# Patient Record
Sex: FEMALE | Race: WHITE | NOT HISPANIC OR LATINO | Employment: PART TIME | URBAN - METROPOLITAN AREA
[De-identification: names, ages, dates, MRNs, and addresses within clinical notes are randomized per-mention and may not be internally consistent; named-entity substitution may affect disease eponyms.]

---

## 2018-02-06 LAB
HBV SURFACE AG SERPL QL IA: NEGATIVE
HIV 1+2 AB+HIV1 P24 AG SERPL QL IA: NON REACTIVE
RUBELLA ABY IGG: 2.73
RUBELLA ANTIBODY IGG QUANTITATIVE: NORMAL IU/ML
T PALLIDUM IGG SER QL: NON REACTIVE

## 2018-05-08 LAB — T PALLIDUM IGG SER QL: NON REACTIVE

## 2018-07-02 ENCOUNTER — TRANSFERRED RECORDS (OUTPATIENT)
Dept: HEALTH INFORMATION MANAGEMENT | Facility: CLINIC | Age: 33
End: 2018-07-02

## 2018-07-03 ENCOUNTER — TELEPHONE (OUTPATIENT)
Dept: INFUSION THERAPY | Facility: CLINIC | Age: 33
End: 2018-07-03

## 2018-07-03 ENCOUNTER — HOSPITAL ENCOUNTER (OUTPATIENT)
Dept: LAB | Facility: CLINIC | Age: 33
Discharge: HOME | End: 2018-07-03
Attending: OBSTETRICS & GYNECOLOGY | Admitting: OBSTETRICS & GYNECOLOGY
Payer: COMMERCIAL

## 2018-07-03 DIAGNOSIS — D50.9 IRON DEFICIENCY ANEMIA: ICD-10-CM

## 2018-07-03 DIAGNOSIS — D50.9 IRON DEFICIENCY ANEMIA: Primary | ICD-10-CM

## 2018-07-03 DIAGNOSIS — O99.011 ANEMIA COMPLICATING PREGNANCY IN FIRST TRIMESTER: ICD-10-CM

## 2018-07-03 LAB
FERRITIN SERPL-MCNC: 8 NG/ML (ref 12–150)
TRANSFERRIN SERPL-MCNC: 445 MG/DL (ref 210–360)

## 2018-07-03 PROCEDURE — 36415 COLL VENOUS BLD VENIPUNCTURE: CPT | Performed by: OBSTETRICS & GYNECOLOGY

## 2018-07-03 PROCEDURE — 82728 ASSAY OF FERRITIN: CPT | Performed by: OBSTETRICS & GYNECOLOGY

## 2018-07-03 PROCEDURE — 84466 ASSAY OF TRANSFERRIN: CPT | Performed by: OBSTETRICS & GYNECOLOGY

## 2018-07-05 DIAGNOSIS — O99.019 ANEMIA COMPLICATING PREGNANCY: ICD-10-CM

## 2018-07-07 ENCOUNTER — HEALTH MAINTENANCE LETTER (OUTPATIENT)
Age: 33
End: 2018-07-07

## 2018-07-10 ENCOUNTER — INFUSION THERAPY VISIT (OUTPATIENT)
Dept: INFUSION THERAPY | Facility: CLINIC | Age: 33
End: 2018-07-10
Attending: OBSTETRICS & GYNECOLOGY
Payer: COMMERCIAL

## 2018-07-10 VITALS
TEMPERATURE: 97.9 F | HEART RATE: 93 BPM | SYSTOLIC BLOOD PRESSURE: 109 MMHG | RESPIRATION RATE: 16 BRPM | DIASTOLIC BLOOD PRESSURE: 68 MMHG

## 2018-07-10 DIAGNOSIS — O99.019 ANEMIA COMPLICATING PREGNANCY: Primary | ICD-10-CM

## 2018-07-10 PROCEDURE — 25000128 H RX IP 250 OP 636: Performed by: OBSTETRICS & GYNECOLOGY

## 2018-07-10 PROCEDURE — 96365 THER/PROPH/DIAG IV INF INIT: CPT

## 2018-07-10 RX ORDER — PRENATAL VIT/IRON FUM/FOLIC AC 27MG-0.8MG
1 TABLET ORAL DAILY
COMMUNITY

## 2018-07-10 RX ADMIN — SODIUM CHLORIDE 250 ML: 9 INJECTION, SOLUTION INTRAVENOUS at 14:37

## 2018-07-10 RX ADMIN — IRON SUCROSE 300 MG: 20 INJECTION, SOLUTION INTRAVENOUS at 14:37

## 2018-07-10 ASSESSMENT — PAIN SCALES - GENERAL: PAINLEVEL: NO PAIN (0)

## 2018-07-10 NOTE — PROGRESS NOTES
Infusion Nursing Note:  Shilpa Barboza presents today for Venofer.    Patient seen by provider today: No   present during visit today: Not Applicable.    Note: educated patient on side effects of Venofer.    Intravenous Access:  Peripheral IV placed.    Treatment Conditions:  Not Applicable.    Post Infusion Assessment:  Patient tolerated infusion without incident.  Site patent and intact, free from redness, edema or discomfort.  No evidence of extravasations.  Access discontinued per protocol.    Discharge Plan:   Discharge instructions reviewed with: Patient.  Patient and/or family verbalized understanding of discharge instructions and all questions answered.  AVS to patient via Novitaz.  Patient will return 7/13/2018 for next appointment.   Patient discharged in stable condition accompanied by: self.  Departure Mode: Ambulatory.    Yvette Perez RN

## 2018-07-10 NOTE — MR AVS SNAPSHOT
After Visit Summary   7/10/2018    Shilpa Barboza    MRN: [id number]           Patient Information     Date Of Birth          1985        Visit Information        Provider Department      7/10/2018 2:30 PM  INFUSION CHAIR 3 [facility]        Today's Diagnoses     Anemia complicating pregnancy    -  1       Follow-ups after your visit        Follow-up notes from your care team     Return in 3 days (on 7/13/2018).      Your next 10 appointments already scheduled     Jul 13, 2018 11:30 AM CDT   Level 2 with  INFUSION CHAIR 20   [facility] ([facility])    [facility]  [address]  [place] [address] [phone]            Jul 16, 2018 10:30 AM CDT   Level 2 with  INFUSION CHAIR 4   [facility] ([facility])    [facility]  [address]  [facility] [id number] [phone]              Who to contact     If you have questions or need follow up information about today's clinic visit or your schedule please contact [facility] directly at [phone].  Normal or non-critical lab and imaging results will be communicated to you by MicroInventionhart, letter or phone within 4 business days after the clinic has received the results. If you do not hear from us within 7 days, please contact the clinic through MicroInventionhart or phone. If you have a critical or abnormal lab result, we will notify you by phone as soon as possible.  Submit refill requests through Embibe or call your pharmacy and they will forward the refill request to us. Please allow 3 business days for your refill to be completed.          Additional Information About Your Visit        MicroInventionhart Information     Embibe gives you secure access to your electronic health record. If you see a primary care provider, you can  also send messages to your care team and make appointments. If you have questions, please call your primary care clinic.  If you do not have a primary care provider, please call [phone] and they will assist you.        Care EveryWhere ID     This is your Care EveryWhere ID. This could be used by other organizations to access your [place] medical records  [id number]        Your Vitals Were     Pulse Temperature Respirations             93 97.9  F (36.6  C) (Oral) 16          Blood Pressure from Last 3 Encounters:   07/10/18 109/68   11/12/16 107/71    Weight from Last 3 Encounters:   11/11/16 59 kg (130 lb)              Today, you had the following     No orders found for display       Primary Care Provider Office Phone # Fax #    Annie Nolen [phone]       [facility] [address]  [place] [address]        Equal Access to Services     : Hadii aad ku hadasho Soomaali, waaxda luqadaha, qaybta kaalmada adeegyada, waxay idiin hayaan estee foster . So [facility] [phone].    ATENCIÓN: Si habla español, tiene a minor disposición servicios gratuitos de asistencia lingüística. Pato al [phone].    We comply with applicable federal civil rights laws and [place] laws. We do not discriminate on the basis of race, color, national origin, age, disability, sex, sexual orientation, or gender identity.            Thank you!     Thank you for choosing [facility]  for your care. Our goal is always to provide you with excellent care. Hearing back from our patients is one way we can continue to improve our services. Please take a few minutes to complete the written survey that you may receive in the mail after your visit with us. Thank you!             Your Updated Medication List - Protect others around you: Learn how to safely use, store and throw away your medicines at www.disposemymeds.org.          This list is accurate as of  7/10/18  4:06 PM.  Always use your most recent med list.                   Brand Name Dispense Instructions for use Diagnosis    calcium citrate-vitamin D 315-200 MG-UNIT Tabs per tablet    CITRACAL     Take 1 tablet by mouth 2 times daily        MAGNESIUM OXIDE PO           prenatal multivitamin plus iron 27-0.8 MG Tabs per tablet      Take 1 tablet by mouth daily        VITAMIN B6 PO      Take by mouth daily

## 2018-07-13 ENCOUNTER — INFUSION THERAPY VISIT (OUTPATIENT)
Dept: INFUSION THERAPY | Facility: CLINIC | Age: 33
End: 2018-07-13
Attending: OBSTETRICS & GYNECOLOGY
Payer: COMMERCIAL

## 2018-07-13 VITALS
TEMPERATURE: 98.1 F | SYSTOLIC BLOOD PRESSURE: 116 MMHG | DIASTOLIC BLOOD PRESSURE: 64 MMHG | HEART RATE: 99 BPM | RESPIRATION RATE: 16 BRPM

## 2018-07-13 DIAGNOSIS — O99.019 ANEMIA COMPLICATING PREGNANCY: Primary | ICD-10-CM

## 2018-07-13 PROCEDURE — 96365 THER/PROPH/DIAG IV INF INIT: CPT

## 2018-07-13 PROCEDURE — 25000128 H RX IP 250 OP 636: Performed by: OBSTETRICS & GYNECOLOGY

## 2018-07-13 RX ADMIN — SODIUM CHLORIDE 250 ML: 9 INJECTION, SOLUTION INTRAVENOUS at 11:43

## 2018-07-13 RX ADMIN — IRON SUCROSE 300 MG: 20 INJECTION, SOLUTION INTRAVENOUS at 11:43

## 2018-07-13 ASSESSMENT — PAIN SCALES - GENERAL: PAINLEVEL: NO PAIN (0)

## 2018-07-13 NOTE — PROGRESS NOTES
Infusion Nursing Note:  Shilpa Barboza presents today for venofer.    Patient seen by provider today: No   present during visit today: Not Applicable.    Note: N/A.    Intravenous Access:  Peripheral IV placed.    Treatment Conditions:  Not Applicable.      Post Infusion Assessment:  Patient tolerated infusion without incident.  Blood return noted pre and post infusion.  Site patent and intact, free from redness, edema or discomfort.  No evidence of extravasations.  Access discontinued per protocol.    Discharge Plan:   Discharge instructions reviewed with: Patient.  Patient and/or family verbalized understanding of discharge instructions and all questions answered.  AVS to patient via [employer].  Patient will return 7/16/18 for next appointment.   Patient discharged in stable condition accompanied by: self.  Departure Mode: Ambulatory.    TONY Jackson RN

## 2018-07-13 NOTE — MR AVS SNAPSHOT
After Visit Summary   7/13/2018    Shilpa Barboza    MRN: [id number]           Patient Information     Date Of Birth          1985        Visit Information        Provider Department      7/13/2018 11:30 AM  INFUSION CHAIR 20 [facility]        Today's Diagnoses     Anemia complicating pregnancy    -  1       Follow-ups after your visit        Your next 10 appointments already scheduled     Jul 16, 2018 10:30 AM CDT   Level 2 with  INFUSION CHAIR 4   [facility] ([facility])    [facility]  [address]  [place] [id number] [phone]              Who to contact     If you have questions or need follow up information about today's clinic visit or your schedule please contact [facility] directly at [phone].  Normal or non-critical lab and imaging results will be communicated to you by Keaton Rowhart, letter or phone within 4 business days after the clinic has received the results. If you do not hear from us within 7 days, please contact the clinic through Keaton Rowhart or phone. If you have a critical or abnormal lab result, we will notify you by phone as soon as possible.  Submit refill requests through ChipRewards or call your pharmacy and they will forward the refill request to us. Please allow 3 business days for your refill to be completed.          Additional Information About Your Visit        MyChart Information     ChipRewards gives you secure access to your electronic health record. If you see a primary care provider, you can also send messages to your care team and make appointments. If you have questions, please call your primary care clinic.  If you do not have a primary care provider, please call [phone] and they will assist you.        Care EveryWhere ID     This is your Care EveryWhere ID. This could be used by other organizations to  access your [place] medical records  [id number]        Your Vitals Were     Pulse Temperature Respirations             99 98.1  F (36.7  C) (Oral) 16          Blood Pressure from Last 3 Encounters:   07/13/18 116/64   07/10/18 109/68   11/12/16 107/71    Weight from Last 3 Encounters:   11/11/16 59 kg (130 lb)              Today, you had the following     No orders found for display       Primary Care Provider Office Phone # Fax #    Annie Nolen [phone]       [facility] [address]  [place] [address]        Equal Access to Services     [facility]: Hadii aad ku hadasho Sobeth, waaxda luqadaha, qaybta kaalmada adecarrieyada, jocelynn foster . So [facility] [phone].    ATENCIÓN: Si habla español, tiene a minor disposición servicios gratuitos de asistencia lingüística. LlK[facility]tering [facility] [phone].    We comply with applicable federal civil rights laws and [place] laws. We do not discriminate on the basis of race, color, national origin, age, disability, sex, sexual orientation, or gender identity.            Thank you!     Thank you for choosing [facility] AND [facility]  for your care. Our goal is always to provide you with excellent care. Hearing back from our patients is one way we can continue to improve our services. Please take a few minutes to complete the written survey that you may receive in the mail after your visit with us. Thank you!             Your Updated Medication List - Protect others around you: Learn how to safely use, store and throw away your medicines at www.disposemymeds.org.          This list is accurate as of 7/13/18  3:52 PM.  Always use your most recent med list.                   Brand Name Dispense Instructions for use Diagnosis    calcium citrate-vitamin D 315-200 MG-UNIT Tabs per tablet    CITRACAL     Take 1 tablet by mouth 2 times daily        MAGNESIUM OXIDE PO           prenatal multivitamin plus iron  27-0.8 MG Tabs per tablet      Take 1 tablet by mouth daily        VITAMIN B6 PO      Take by mouth daily

## 2018-07-16 ENCOUNTER — INFUSION THERAPY VISIT (OUTPATIENT)
Dept: INFUSION THERAPY | Facility: CLINIC | Age: 33
End: 2018-07-16
Attending: OBSTETRICS & GYNECOLOGY
Payer: COMMERCIAL

## 2018-07-16 VITALS
HEART RATE: 90 BPM | TEMPERATURE: 98.1 F | DIASTOLIC BLOOD PRESSURE: 65 MMHG | OXYGEN SATURATION: 99 % | RESPIRATION RATE: 18 BRPM | SYSTOLIC BLOOD PRESSURE: 105 MMHG

## 2018-07-16 DIAGNOSIS — O99.019 ANEMIA COMPLICATING PREGNANCY: Primary | ICD-10-CM

## 2018-07-16 PROCEDURE — 96365 THER/PROPH/DIAG IV INF INIT: CPT

## 2018-07-16 PROCEDURE — 25000128 H RX IP 250 OP 636: Performed by: OBSTETRICS & GYNECOLOGY

## 2018-07-16 PROCEDURE — 96366 THER/PROPH/DIAG IV INF ADDON: CPT

## 2018-07-16 RX ADMIN — SODIUM CHLORIDE 250 ML: 9 INJECTION, SOLUTION INTRAVENOUS at 10:42

## 2018-07-16 RX ADMIN — IRON SUCROSE 300 MG: 20 INJECTION, SOLUTION INTRAVENOUS at 10:42

## 2018-07-16 ASSESSMENT — PAIN SCALES - GENERAL: PAINLEVEL: NO PAIN (0)

## 2018-07-16 NOTE — MR AVS SNAPSHOT
After Visit Summary   7/16/2018    Shilpa Barboza    MRN: [id number]           Patient Information     Date Of Birth          1985        Visit Information        Provider Department      7/16/2018 10:30 AM  INFUSION CHAIR 4 [facility]        Today's Diagnoses     Anemia complicating pregnancy    -  1       Follow-ups after your visit        Who to contact     If you have questions or need follow up information about today's clinic visit or your schedule please contact [facility] directly at [phone].  Normal or non-critical lab and imaging results will be communicated to you by Living Map Companyhart, letter or phone within 4 business days after the clinic has received the results. If you do not hear from us within 7 days, please contact the clinic through Giant Swarm or phone. If you have a critical or abnormal lab result, we will notify you by phone as soon as possible.  Submit refill requests through Giant Swarm or call your pharmacy and they will forward the refill request to us. Please allow 3 business days for your refill to be completed.          Additional Information About Your Visit        MyChart Information     Giant Swarm gives you secure access to your electronic health record. If you see a primary care provider, you can also send messages to your care team and make appointments. If you have questions, please call your primary care clinic.  If you do not have a primary care provider, please call [phone] and they will assist you.        Care EveryWhere ID     This is your Care EveryWhere ID. This could be used by other organizations to access your [place] medical records  [id number]        Your Vitals Were     Pulse Temperature Respirations Pulse Oximetry          90 98.1  F (36.7  C) (Oral) 18 99%         Blood Pressure from Last 3 Encounters:   07/16/18 105/65   07/13/18 116/64   07/10/18 109/68    Weight from Last 3  Encounters:   11/11/16 59 kg (130 lb)              Today, you had the following     No orders found for display       Primary Care Provider Office Phone # Fax #    Annie Nolen [phone]       [facility] [address]  [place] [address]        Equal Access to Services     [facility]: Hadii aad ku hadasho Soomaali, waaxda luqadaha, qaybta kaalmada adeegyada, waxay idiin hayaan adecarrie lo ladhruvn . So [facility] [phone].    ATENCIÓN: Si habla español, tiene a minor disposición servicios gratuitos de asistencia lingüística. Pato al [phone].    We comply with applicable federal civil rights laws and [place] laws. We do not discriminate on the basis of race, color, national origin, age, disability, sex, sexual orientation, or gender identity.            Thank you!     Thank you for choosing [facility]  for your care. Our goal is always to provide you with excellent care. Hearing back from our patients is one way we can continue to improve our services. Please take a few minutes to complete the written survey that you may receive in the mail after your visit with us. Thank you!             Your Updated Medication List - Protect others around you: Learn how to safely use, store and throw away your medicines at www.disposemymeds.org.          This list is accurate as of 7/16/18 12:22 PM.  Always use your most recent med list.                   Brand Name Dispense Instructions for use Diagnosis    calcium citrate-vitamin D 315-200 MG-UNIT Tabs per tablet    CITRACAL     Take 1 tablet by mouth 2 times daily        MAGNESIUM OXIDE PO           prenatal multivitamin plus iron 27-0.8 MG Tabs per tablet      Take 1 tablet by mouth daily        VITAMIN B6 PO      Take by mouth daily

## 2018-07-16 NOTE — PROGRESS NOTES
Infusion Nursing Note:  Shilpa Barboza presents today for Venofer.    Patient seen by provider today: No   present during visit today: Not Applicable.    Note: N/A.    Intravenous Access:  Peripheral IV placed.    Treatment Conditions:  Not Applicable.      Post Infusion Assessment:  Patient tolerated infusion without incident.  Blood return noted pre and post infusion.  Site patent and intact, free from redness, edema or discomfort.  No evidence of extravasations.  Access discontinued per protocol.    Discharge Plan:   Patient declined prescription refills.  Discharge instructions reviewed with: Patient.  Patient verbalized understanding of discharge instructions and all questions answered.  Copy of AVS reviewed with patient.  Patient will return as scheduled for next appointment.  Patient discharged in stable condition accompanied by: self.  Departure Mode: Ambulatory.    Flores Broussard RN

## 2018-07-30 LAB — GROUP B STREP PCR: NEGATIVE

## 2018-08-24 ENCOUNTER — ANESTHESIA (OUTPATIENT)
Dept: OBGYN | Facility: CLINIC | Age: 33
End: 2018-08-24
Payer: COMMERCIAL

## 2018-08-24 ENCOUNTER — ANESTHESIA EVENT (OUTPATIENT)
Dept: OBGYN | Facility: CLINIC | Age: 33
End: 2018-08-24
Payer: COMMERCIAL

## 2018-08-24 ENCOUNTER — HOSPITAL ENCOUNTER (INPATIENT)
Facility: CLINIC | Age: 33
LOS: 2 days | Discharge: HOME | End: 2018-08-26
Attending: OBSTETRICS & GYNECOLOGY | Admitting: OBSTETRICS & GYNECOLOGY
Payer: COMMERCIAL

## 2018-08-24 LAB
ABO + RH BLD: NORMAL
ABO + RH BLD: NORMAL
SPECIMEN EXP DATE BLD: NORMAL

## 2018-08-24 PROCEDURE — 25000125 ZZHC RX 250

## 2018-08-24 PROCEDURE — 86780 TREPONEMA PALLIDUM: CPT | Performed by: OBSTETRICS & GYNECOLOGY

## 2018-08-24 PROCEDURE — 25000128 H RX IP 250 OP 636

## 2018-08-24 PROCEDURE — 27110038 ZZH RX 271

## 2018-08-24 PROCEDURE — 10907ZC DRAINAGE OF AMNIOTIC FLUID, THERAPEUTIC FROM PRODUCTS OF CONCEPTION, VIA NATURAL OR ARTIFICIAL OPENING: ICD-10-PCS | Performed by: OBSTETRICS & GYNECOLOGY

## 2018-08-24 PROCEDURE — 40000671 ZZH STATISTIC ANESTHESIA CASE

## 2018-08-24 PROCEDURE — 3E0R3BZ INTRODUCTION OF ANESTHETIC AGENT INTO SPINAL CANAL, PERCUTANEOUS APPROACH: ICD-10-PCS | Performed by: OBSTETRICS & GYNECOLOGY

## 2018-08-24 PROCEDURE — 86901 BLOOD TYPING SEROLOGIC RH(D): CPT | Performed by: OBSTETRICS & GYNECOLOGY

## 2018-08-24 PROCEDURE — 12000029 ZZH R&B OB INTERMEDIATE

## 2018-08-24 PROCEDURE — 37000011 ZZH ANESTHESIA WARD SERVICE

## 2018-08-24 PROCEDURE — 25000132 ZZH RX MED GY IP 250 OP 250 PS 637: Performed by: OBSTETRICS & GYNECOLOGY

## 2018-08-24 PROCEDURE — 72200001 ZZH LABOR CARE VAGINAL DELIVERY SINGLE

## 2018-08-24 PROCEDURE — 00HU33Z INSERTION OF INFUSION DEVICE INTO SPINAL CANAL, PERCUTANEOUS APPROACH: ICD-10-PCS | Performed by: OBSTETRICS & GYNECOLOGY

## 2018-08-24 PROCEDURE — 25000125 ZZHC RX 250: Performed by: OBSTETRICS & GYNECOLOGY

## 2018-08-24 PROCEDURE — 0UQMXZZ REPAIR VULVA, EXTERNAL APPROACH: ICD-10-PCS | Performed by: OBSTETRICS & GYNECOLOGY

## 2018-08-24 PROCEDURE — 25000128 H RX IP 250 OP 636: Performed by: OBSTETRICS & GYNECOLOGY

## 2018-08-24 PROCEDURE — 86900 BLOOD TYPING SEROLOGIC ABO: CPT | Performed by: OBSTETRICS & GYNECOLOGY

## 2018-08-24 RX ORDER — ONDANSETRON 2 MG/ML
4 INJECTION INTRAMUSCULAR; INTRAVENOUS EVERY 6 HOURS PRN
Status: DISCONTINUED | OUTPATIENT
Start: 2018-08-24 | End: 2018-08-24

## 2018-08-24 RX ORDER — HYDROCORTISONE 2.5 %
CREAM (GRAM) TOPICAL 3 TIMES DAILY PRN
Status: DISCONTINUED | OUTPATIENT
Start: 2018-08-24 | End: 2018-08-26 | Stop reason: HOSPADM

## 2018-08-24 RX ORDER — ACETAMINOPHEN 325 MG/1
650 TABLET ORAL EVERY 4 HOURS PRN
Status: DISCONTINUED | OUTPATIENT
Start: 2018-08-24 | End: 2018-08-26 | Stop reason: HOSPADM

## 2018-08-24 RX ORDER — OXYCODONE AND ACETAMINOPHEN 5; 325 MG/1; MG/1
1 TABLET ORAL
Status: DISCONTINUED | OUTPATIENT
Start: 2018-08-24 | End: 2018-08-24

## 2018-08-24 RX ORDER — NALOXONE HYDROCHLORIDE 0.4 MG/ML
.1-.4 INJECTION, SOLUTION INTRAMUSCULAR; INTRAVENOUS; SUBCUTANEOUS
Status: DISCONTINUED | OUTPATIENT
Start: 2018-08-24 | End: 2018-08-26 | Stop reason: HOSPADM

## 2018-08-24 RX ORDER — OXYTOCIN 10 [USP'U]/ML
10 INJECTION, SOLUTION INTRAMUSCULAR; INTRAVENOUS
Status: DISCONTINUED | OUTPATIENT
Start: 2018-08-24 | End: 2018-08-24

## 2018-08-24 RX ORDER — EPHEDRINE SULFATE 50 MG/ML
INJECTION, SOLUTION INTRAMUSCULAR; INTRAVENOUS; SUBCUTANEOUS
Status: DISCONTINUED
Start: 2018-08-24 | End: 2018-08-24 | Stop reason: HOSPADM

## 2018-08-24 RX ORDER — OXYTOCIN 10 [USP'U]/ML
10 INJECTION, SOLUTION INTRAMUSCULAR; INTRAVENOUS
Status: DISCONTINUED | OUTPATIENT
Start: 2018-08-24 | End: 2018-08-26 | Stop reason: HOSPADM

## 2018-08-24 RX ORDER — IBUPROFEN 800 MG/1
800 TABLET, FILM COATED ORAL
Status: DISCONTINUED | OUTPATIENT
Start: 2018-08-24 | End: 2018-08-24

## 2018-08-24 RX ORDER — AMOXICILLIN 250 MG
1 CAPSULE ORAL 2 TIMES DAILY
Status: DISCONTINUED | OUTPATIENT
Start: 2018-08-24 | End: 2018-08-26 | Stop reason: HOSPADM

## 2018-08-24 RX ORDER — EPHEDRINE SULFATE 50 MG/ML
5 INJECTION, SOLUTION INTRAMUSCULAR; INTRAVENOUS; SUBCUTANEOUS
Status: DISCONTINUED | OUTPATIENT
Start: 2018-08-24 | End: 2018-08-24

## 2018-08-24 RX ORDER — MISOPROSTOL 200 UG/1
400 TABLET ORAL
Status: DISCONTINUED | OUTPATIENT
Start: 2018-08-24 | End: 2018-08-26 | Stop reason: HOSPADM

## 2018-08-24 RX ORDER — CARBOPROST TROMETHAMINE 250 UG/ML
250 INJECTION, SOLUTION INTRAMUSCULAR
Status: DISCONTINUED | OUTPATIENT
Start: 2018-08-24 | End: 2018-08-24

## 2018-08-24 RX ORDER — NALOXONE HYDROCHLORIDE 0.4 MG/ML
.1-.4 INJECTION, SOLUTION INTRAMUSCULAR; INTRAVENOUS; SUBCUTANEOUS
Status: DISCONTINUED | OUTPATIENT
Start: 2018-08-24 | End: 2018-08-24

## 2018-08-24 RX ORDER — SODIUM CHLORIDE, SODIUM LACTATE, POTASSIUM CHLORIDE, CALCIUM CHLORIDE 600; 310; 30; 20 MG/100ML; MG/100ML; MG/100ML; MG/100ML
INJECTION, SOLUTION INTRAVENOUS CONTINUOUS
Status: DISCONTINUED | OUTPATIENT
Start: 2018-08-24 | End: 2018-08-24

## 2018-08-24 RX ORDER — FENTANYL CITRATE 50 UG/ML
50-100 INJECTION, SOLUTION INTRAMUSCULAR; INTRAVENOUS
Status: DISCONTINUED | OUTPATIENT
Start: 2018-08-24 | End: 2018-08-24

## 2018-08-24 RX ORDER — ACETAMINOPHEN 325 MG/1
650 TABLET ORAL EVERY 4 HOURS PRN
Status: DISCONTINUED | OUTPATIENT
Start: 2018-08-24 | End: 2018-08-24

## 2018-08-24 RX ORDER — LANOLIN 100 %
OINTMENT (GRAM) TOPICAL
Status: DISCONTINUED | OUTPATIENT
Start: 2018-08-24 | End: 2018-08-26 | Stop reason: HOSPADM

## 2018-08-24 RX ORDER — OXYTOCIN/0.9 % SODIUM CHLORIDE 30/500 ML
100 PLASTIC BAG, INJECTION (ML) INTRAVENOUS CONTINUOUS
Status: DISCONTINUED | OUTPATIENT
Start: 2018-08-24 | End: 2018-08-26 | Stop reason: HOSPADM

## 2018-08-24 RX ORDER — NALBUPHINE HYDROCHLORIDE 10 MG/ML
2.5-5 INJECTION, SOLUTION INTRAMUSCULAR; INTRAVENOUS; SUBCUTANEOUS EVERY 6 HOURS PRN
Status: DISCONTINUED | OUTPATIENT
Start: 2018-08-24 | End: 2018-08-24

## 2018-08-24 RX ORDER — BISACODYL 10 MG
10 SUPPOSITORY, RECTAL RECTAL DAILY PRN
Status: DISCONTINUED | OUTPATIENT
Start: 2018-08-26 | End: 2018-08-26 | Stop reason: HOSPADM

## 2018-08-24 RX ORDER — AMOXICILLIN 250 MG
2 CAPSULE ORAL 2 TIMES DAILY
Status: DISCONTINUED | OUTPATIENT
Start: 2018-08-24 | End: 2018-08-26 | Stop reason: HOSPADM

## 2018-08-24 RX ORDER — OXYTOCIN/0.9 % SODIUM CHLORIDE 30/500 ML
100-340 PLASTIC BAG, INJECTION (ML) INTRAVENOUS CONTINUOUS PRN
Status: COMPLETED | OUTPATIENT
Start: 2018-08-24 | End: 2018-08-24

## 2018-08-24 RX ORDER — BUPIVACAINE HCL/0.9 % NACL/PF 0.125 %
PLASTIC BAG, INJECTION (ML) EPIDURAL
Status: COMPLETED
Start: 2018-08-24 | End: 2018-08-24

## 2018-08-24 RX ORDER — BUPIVACAINE HCL/0.9 % NACL/PF 0.125 %
PLASTIC BAG, INJECTION (ML) EPIDURAL CONTINUOUS
Status: DISCONTINUED | OUTPATIENT
Start: 2018-08-24 | End: 2018-08-24

## 2018-08-24 RX ORDER — IBUPROFEN 800 MG/1
800 TABLET, FILM COATED ORAL EVERY 6 HOURS PRN
Status: DISCONTINUED | OUTPATIENT
Start: 2018-08-24 | End: 2018-08-26 | Stop reason: HOSPADM

## 2018-08-24 RX ORDER — METHYLERGONOVINE MALEATE 0.2 MG/ML
200 INJECTION INTRAVENOUS
Status: DISCONTINUED | OUTPATIENT
Start: 2018-08-24 | End: 2018-08-24

## 2018-08-24 RX ORDER — OXYTOCIN/0.9 % SODIUM CHLORIDE 30/500 ML
340 PLASTIC BAG, INJECTION (ML) INTRAVENOUS CONTINUOUS PRN
Status: DISCONTINUED | OUTPATIENT
Start: 2018-08-24 | End: 2018-08-26 | Stop reason: HOSPADM

## 2018-08-24 RX ADMIN — OXYTOCIN-SODIUM CHLORIDE 0.9% IV SOLN 30 UNIT/500ML 340 ML/HR: 30-0.9/5 SOLUTION at 16:51

## 2018-08-24 RX ADMIN — SODIUM CHLORIDE, POTASSIUM CHLORIDE, SODIUM LACTATE AND CALCIUM CHLORIDE: 600; 310; 30; 20 INJECTION, SOLUTION INTRAVENOUS at 10:50

## 2018-08-24 RX ADMIN — IBUPROFEN 800 MG: 800 TABLET ORAL at 17:31

## 2018-08-24 RX ADMIN — Medication: at 08:56

## 2018-08-24 RX ADMIN — SODIUM CHLORIDE, POTASSIUM CHLORIDE, SODIUM LACTATE AND CALCIUM CHLORIDE 1000 ML: 600; 310; 30; 20 INJECTION, SOLUTION INTRAVENOUS at 08:06

## 2018-08-24 NOTE — PROVIDER NOTIFICATION
08/24/18 0620   Provider Notification   Provider Name/Title Dr. Santana   Method of Notification Phone   Request Evaluate - Remote   Notification Reason Labor Status;Uterine Activity;Status Update;SVE;Patient Arrived   Updated MD on pt's arrival. SVE 1.5\70\-2 anu every 2-4 minutes and uncomfortable. Pt currently ambulating unit. MD requesting repeat SVE after an hour and call with update.

## 2018-08-24 NOTE — BRIEF OP NOTE
Delivery Summary:    1. IUP at 39+3/7 wks. Spontaneous labor.   2. AROM with clear fluid.   3. Epidural for pain management.   4.  of female in SHERRY presentation with nuchal cord x 1.   5. Delayed cord clamp done. Cord clamped and cut.   6. Placenta intact with 3-v cord. Pitocin is givne.   7. EBL of 300 ml.   8. Left labial laceration with 3-0 vicryl .   9. Dr. Becerril for delivery.     Wandy Becerril

## 2018-08-24 NOTE — PLAN OF CARE
Data: [facility] presented to [facility]: 2018  5:18 AM.  Reason for maternal/fetal assessment is R\O Labor. Patient reports uterine contractions beginning @ 0200.  Patient is a .  Prenatal record reviewed. Pregnancy  has been uncomplicated.  Gestational Age 39w3d. VSS. Fetal movement present. Patient denies leaking of vaginal fluid/rupture of membranes, vaginal bleeding, nausea, vomiting, headache, visual disturbances. Support person is present.   Action: Verbal consent for EFM. Triage assessment completed. Bill of rights reviewed.  Response: Patient verbalized agreement with plan. Will contact Dr Yariel Balderas with update and further orders.

## 2018-08-24 NOTE — IP AVS SNAPSHOT
[facility]    [address]    [facility] [address]    Phone:  [phone]    Fax:  [phone]                                       After Visit Summary   8/24/2018    Shilpa Barboza    MRN: [id number]           After Visit Summary Signature Page     I have received my discharge instructions, and my questions have been answered. I have discussed any challenges I see with this plan with the nurse or doctor.    ..........................................................................................................................................  Patient/Patient Representative Signature      ..........................................................................................................................................  Patient Representative Print Name and Relationship to Patient    ..................................................               ................................................  Date                                            Time    ..........................................................................................................................................  Reviewed by Signature/Title    ...................................................              ..............................................  Date                                                            Time          22EPIC Rev 08/18

## 2018-08-24 NOTE — H&P
No significant change in general health status based on examination of the patient, review of Nursing Admission Database and prenatal record.    EFW: 8lb     Wandy Becerril

## 2018-08-24 NOTE — PROGRESS NOTES
"Labor Progress Note:    S: Pt is uncomfortable with contractions.     O:  Ht 1.626 m (5' 4\")  Wt 77.6 kg (171 lb)  BMI 29.35 kg/m2  SVE: 2/80/-2 AROM with clear fluid.   TOCO: Q 2-3 minutes  FHR: Cat 1    A/P: 33 yo  at 39+3/ wks. Spontaneous labor.   1. Anticipate .   2. Pain management as desired.       Wandy Becerril    "

## 2018-08-24 NOTE — PROVIDER NOTIFICATION
08/24/18 1621   Provider Notification   Provider Name/Title Dr. Becerril   Method of Notification Electronic Page     Updated of fetal station change from +2, trial push station was +3, after 1 hour of laboring down.  Dr. Becerril stated would be present momentarily.

## 2018-08-24 NOTE — PROGRESS NOTES
"Labor Progress Notes:    S: pt is comfortabel with epidural.     O:  BP 95/55  Temp 98.3  F (36.8  C)  Resp 16  Ht 1.626 m (5' 4\")  Wt 77.6 kg (171 lb)  BMI 29.35 kg/m2  SVE: 6/80/-2   TOCO: Q2-4 minutes  FHR: 130's cat 1    A/p; 31 yo  at 39+3/7 wks. Spontaneous labor   1. Anticipate .   2. Epidural for pain.   3. GBS negative.     Wandy Becerril    "

## 2018-08-24 NOTE — PROVIDER NOTIFICATION
08/24/18 0739   Provider Notification   Provider Name/Title TITUS   Method of Notification At Bedside   Request Evaluate in Person   Notification Reason SVE   POC Discussed, patient agreed to AROM, questions answered.

## 2018-08-24 NOTE — PROVIDER NOTIFICATION
08/24/18 1525   Provider Notification   Provider Name/Title TITUS   Method of Notification Electronic Page  (WEB PAGED UPDATE)

## 2018-08-24 NOTE — ANESTHESIA PREPROCEDURE EVALUATION
PAC NOTE:       ANESTHESIA PRE EVALUATION:  Anesthesia Evaluation       history and physical reviewed .             ROS/MED HX    ENT/Pulmonary:  - neg pulmonary ROS     Neurologic:  - neg neurologic ROS     Cardiovascular:  - neg cardiovascular ROS       METS/Exercise Tolerance:     Hematologic:         Musculoskeletal:         GI/Hepatic:  - neg GI/hepatic ROS       Renal/Genitourinary:         Endo:         Psychiatric:         Infectious Disease:         Malignancy:         Other:                     Physical Exam  Normal systems: cardiovascular, pulmonary and dental    Airway   Mallampati: II    Dental     Cardiovascular       Pulmonary     Other findings: .Lab Test        11/12/16                       0748          HGB          11.6*          No lab results found.    neg OB ROS            Anesthesia Plan  Procedure only, no anesthetic delivered    History & Physical Review      ASA Status:  2 .  OB Epidural Asa: 2       Plan for Epidural          Postoperative Care      Consents  Anesthetic plan, risks, benefits and alternatives discussed with:  Patient..                            .

## 2018-08-24 NOTE — PROVIDER NOTIFICATION
08/24/18 1240   Provider Notification   Provider Name/Title TITUS   Method of Notification At Bedside   Request Evaluate in Person   Notification Reason SVE

## 2018-08-24 NOTE — PROVIDER NOTIFICATION
08/24/18 0702   Provider Notification   Provider Name/Title Dr. Jacobo Lockwood   Method of Notification Phone   Request Evaluate - Remote   Notification Reason Status Update;SVE   Updated MD ARMSTRONG 2/70/-2. MD gave orders to admit pt, labor orders received

## 2018-08-24 NOTE — IP AVS SNAPSHOT
MRN:[id number]                      After Visit Summary   8/24/2018    Shilpa Barboza    MRN: [id number]           Thank you!     Thank you for choosing [facility] for your care. Our goal is always to provide you with excellent care. Hearing back from our patients is one way we can continue to improve our services. Please take a few minutes to complete the written survey that you may receive in the mail after you visit. If you would like to speak to someone directly about your visit please contact Patient Relations at [phone]. Thank you!          Patient Information     Date Of Birth          1985        Designated Caregiver       Most Recent Value    Caregiver    Will someone help with your care after discharge? no      About your hospital stay     You were admitted on:  August 24, 2018 You last received care in the:  [facility] [place] [facility]    You were discharged on:  August 26, 2018        Reason for your hospital stay       Maternity care                  Who to Call     For medical emergencies, please call 911.  For non-urgent questions about your medical care, please call your primary care provider or clinic, [phone]          Attending Provider     Provider Specialty    Yariel Santana MD OB/Gyn    Wandy Becerril MD OB/Gyn       Primary Care Provider Office Phone # Fax #    Annie Nolen [phone]      After Care Instructions     Activity       Review discharge instructions            Diet       Resume previous diet            Discharge Instructions - Postpartum visit       Schedule postpartum visit with your provider and return to clinic in 6 weeks.                  Further instructions from your care team       You should be on pelvic rest with no heavy lifting >25 lb for 6 weeks.  Please call or return if you have fever >/= 100.4 degrees Farenheit (38.0 degrees Celsius), severe worsening abdominal pain not relieved by  oral pain medications, heavy persistent vaginal bleeding, chest pain, palpitations, shortness of breath, dizziness, depressed mood, or for any other major concern.  You may use over the counter ibuprofen (400 mg every 4 hours) and tylenol (500 mg every 4 hours) as needed for pain.  You may also use stool softener (Colace/Docusate 100 mg 1-2 tabs per day) as needed for constipation.  These are safe with breastfeeding.  You should take ferrous sulfate 325 mg twice a day (iron supplement) for 2-3 weeks for anemia.      Please call the office to schedule a routine postpartum examination in 6 weeks, or sooner if instructed so by your physician.  If you had gestational diabetes during pregnancy, then you must also schedule a 2 hour glucose test during your postpartum examination.        Lactation: [phone]     [facility]: [phone]    Please schedule a follow up appointment with your OBGYN in 6 weeks.     Postpartum Vaginal Delivery Instructions    Activity       Ask family and friends for help when you need it.    Do not place anything in your vagina for 6 weeks.    You are not restricted on other activities, but take it easy for a few weeks to allow your body to recover from delivery.  You are able to do any activities you feel up to that point.    No driving until you have stopped taking your pain medications (usually two weeks after delivery).     Call your health care provider if you have any of these symptoms:       Increased pain, swelling, redness, or fluid around your stiches from an episiotomy or perineal tear.    A fever above 100.4 F (38 C) with or without chills when placing a thermometer under your tongue.    You soak a sanitary pad with blood within 1 hour, or you see blood clots larger than a golf ball.    Bleeding that lasts more than 6 weeks.    Vaginal discharge that smells bad.    Severe pain, cramping or tenderness in your lower belly area.    A need to urinate more frequently (use the  "toilet more often), more urgently (use the toilet very quickly), or it burns when you urinate.    Nausea and vomiting.    Redness, swelling or pain around a vein in your leg.    Problems breastfeeding or a red or painful area on your breast.    Chest pain and cough or are gasping for air.    Problems coping with sadness, anxiety, or depression.  If you have any concerns about hurting yourself or the baby, call your provider immediately.     You have questions or concerns after you return home.     Keep your hands clean:  Always wash your hands before touching your perineal area and stitches.  This helps reduce your risk of infection.  If your hands aren't dirty, you may use an alcohol hand-rub to clean your hands. Keep your nails clean and short.        Pending Results     No orders found from 8/22/2018 to 8/25/2018.            Statement of Approval     Ordered          08/26/18 1128  I have reviewed and agree with all the recommendations and orders detailed in this document.  EFFECTIVE NOW     Approved and electronically signed by:  Yariel Santana MD             Admission Information     Date & Time Provider Department Dept. Phone    8/24/2018 Wandy Becerril MD [facility] [phone]      Your Vitals Were     Blood Pressure Pulse Temperature Respirations Height Weight    109/67 83 98.2  F (36.8  C) (Oral) 16 1.626 m (5' 4\") 77.6 kg (171 lb)    BMI (Body Mass Index)                   29.35 kg/m2           MyChart Information     [facility] gives you secure access to your electronic health record. If you see a primary care provider, you can also send messages to your care team and make appointments. If you have questions, please call your primary care clinic.  If you do not have a primary care provider, please call [phone] and they will assist you.        Care EveryWhere ID     This is your Care EveryWhere ID. This could be used by other organizations to access your [facility] [place] records  [id number]        Equal Access to Services     [facility] FRITZ : Hadii aad ku hadramirosallie Christopher, wacarolinada luz, qaybta jocelynn buchanan. So [facility] [phone].    ATENCIÓN: Si habla español, tiene a minor disposición servicios gratuitos de asistencia lingüística. Llame al [phone].    We comply with applicable federal civil rights laws and [place] laws. We do not discriminate on the basis of race, color, national origin, age, disability, sex, sexual orientation, or gender identity.               Review of your medicines      CONTINUE these medicines which have NOT CHANGED        Dose / Directions    prenatal multivitamin plus iron 27-0.8 MG Tabs per tablet        Dose:  1 tablet   Take 1 tablet by mouth daily   Refills:  0         STOP taking     calcium citrate-vitamin D 315-200 MG-UNIT Tabs per tablet   Commonly known as:  CITRACAL           MAGNESIUM OXIDE PO           VITAMIN B6 PO                    Protect others around you: Learn how to safely use, store and throw away your medicines at www.disposemymeds.org.             Medication List: This is a list of all your medications and when to take them. Check marks below indicate your daily home schedule. Keep this list as a reference.      Medications           Morning Afternoon Evening Bedtime As Needed    prenatal multivitamin plus iron 27-0.8 MG Tabs per tablet   Take 1 tablet by mouth daily

## 2018-08-24 NOTE — ANESTHESIA PROCEDURE NOTES
Peripheral nerve/Neuraxial procedure note : epidural catheter  Pre-Procedure  Performed by SEBASTIAN SALVADOR  Referred by MD YARELY  Location: pre-op      Pre-Anesthestic Checklist: patient identified, IV checked, site marked, risks and benefits discussed, informed consent, monitors and equipment checked, pre-op evaluation, at physician/surgeon's request and post-op pain management    Timeout  Correct Patient: Yes   Correct Procedure: Yes   Correct Site: Yes   Correct Laterality: Yes   Correct Position: Yes   Site Marked: Yes   .   Procedure Documentation    .    Procedure:    Epidural catheter.     .  .       Assessment/Narrative  .  .  . Comments:  .Diagnosis- Anticipated vaginal delivery    Continuous Labor Epidural, indication is for labor pain. Following an discussion of the expectations, benefits and risk (including but not limited to nerve damage, infection, bleeding, spinal headache, partial or failed block)--questions were encouraged and answered. The patient appears to understand, and consents to proceed.     The patient received a fluid bolus per orders    Time-out performed.     The patient was in the sitting position, a PVP prep times three was done in the lumbar area followed by placement of a sterile drape. The skin was then infiltrated with lidocaine. A 17ga Touhy needle was then advanced under a loss of resistance technique until the epidural space was identified. The epidural catheter was then advanced and secured. The catheter was then bolused in divided doses with Bupivicaine 0.25% 12 cc plus, while the patient/fetus was monitored. Infusion orders written and infusion of 0.125% bupivicaine 15cc per hour started.    I or my partner, was immediately available and frequently monitored at necessary intervals.      RAZ Salvador

## 2018-08-25 LAB
HGB BLD-MCNC: 9 G/DL (ref 11.7–15.7)
T PALLIDUM AB SER QL: NONREACTIVE

## 2018-08-25 PROCEDURE — 12000029 ZZH R&B OB INTERMEDIATE

## 2018-08-25 PROCEDURE — 25000132 ZZH RX MED GY IP 250 OP 250 PS 637: Performed by: OBSTETRICS & GYNECOLOGY

## 2018-08-25 PROCEDURE — 85018 HEMOGLOBIN: CPT | Performed by: OBSTETRICS & GYNECOLOGY

## 2018-08-25 PROCEDURE — 36415 COLL VENOUS BLD VENIPUNCTURE: CPT | Performed by: OBSTETRICS & GYNECOLOGY

## 2018-08-25 PROCEDURE — 40000084 ZZH STATISTIC IP LACTATION SERVICES 16-30 MIN

## 2018-08-25 RX ORDER — FERROUS SULFATE 325(65) MG
325 TABLET ORAL DAILY
Status: DISCONTINUED | OUTPATIENT
Start: 2018-08-25 | End: 2018-08-26 | Stop reason: HOSPADM

## 2018-08-25 RX ADMIN — IBUPROFEN 800 MG: 800 TABLET ORAL at 16:50

## 2018-08-25 RX ADMIN — IBUPROFEN 800 MG: 800 TABLET ORAL at 00:39

## 2018-08-25 RX ADMIN — IBUPROFEN 800 MG: 800 TABLET ORAL at 07:48

## 2018-08-25 RX ADMIN — SENNOSIDES AND DOCUSATE SODIUM 1 TABLET: 8.6; 5 TABLET ORAL at 20:33

## 2018-08-25 RX ADMIN — SENNOSIDES AND DOCUSATE SODIUM 1 TABLET: 8.6; 5 TABLET ORAL at 07:49

## 2018-08-25 NOTE — PLAN OF CARE
Data: Shilpa Barboza transferred to postpartum unit via wheelchair at 2015. Baby transferred via parent's arms.  Action: Receiving unit notified of transfer: Yes. Patient and family notified of room change. Report given to TONY Shukla at 2025. Belongings sent to receiving unit. Accompanied by Registered Nurse. Oriented patient to surroundings. Call light within reach. ID bands double-checked with receiving RN.  Response: Patient tolerated transfer and is stable.

## 2018-08-25 NOTE — PLAN OF CARE
Problem: Patient Care Overview  Goal: Plan of Care/Patient Progress Review  Outcome: Improving  VSS. Taking ibuprofen. Up independently and voiding adequately. Spouse is very helpful and supportive to patient, especially with breastfeeding. Hand expressing colostrum to entice latch.

## 2018-08-25 NOTE — PROGRESS NOTES
Doing well     vss afeb    Fundus firm and nt  Extremities nl    Hgb=9.0    A: PPD 1 doing well      Anemia    P: FeSO4      Routine pp care      Discharge tomorrow

## 2018-08-25 NOTE — PLAN OF CARE
Problem: Patient Care Overview  Goal: Plan of Care/Patient Progress Review  Outcome: Improving  Patient vitally stable, voiding without issue, tolerating regular diet, ambulating independently. Postpartum checks WDL. Patient states pain is minimal, 1/10, has taken prn Ibuprofen x1. Offered again and patient declined at this time, also utilizing ice with kelly cares. Breastfeeding, some support from staff provided,  very supportive and assisting with infant cares.

## 2018-08-25 NOTE — ANESTHESIA POSTPROCEDURE EVALUATION
Patient: Shilpa Barboza    * No procedures listed *    Diagnosis:* No pre-op diagnosis entered *  Diagnosis Additional Information: .Intrauterine Pregnancy, Labor      Anesthesia Type:  Epidural    Note:  Anesthesia Post Evaluation         Comments:     S/P epidural for labor.   I or my partner was immediately available for management of this patient during epidural analgesia infusion.  VSS.  Doing well. Block resolved.  Neuro at baseline. Denies positional headache. Minimal side effects easily managed w/ PRN meds. No apparent anesthetic complications. No follow-up required.    Clarke Charles MD        Last vitals:  Vitals:    08/24/18 1834 08/24/18 2040 08/25/18 0035   BP: 114/61 99/59 118/75   Pulse:  97    Resp:  18 16   Temp:  98.5  F (36.9  C) 98.5  F (36.9  C)         Electronically Signed By: Clarke Charles MD  August 25, 2018  7:40 AM

## 2018-08-25 NOTE — L&D DELIVERY NOTE
Delivery Date:  2018      PROCEDURE:  The patient is a 32-year-old G2, P0-0-1-0 at 39 and 3/7 weeks' with uncomplicated pregnancy.  She was admitted to Labor and Delivery this morning with regular painful contractions and had changed from 1-1/2 to 2 cm, anu every 2-3 minutes.  She subsequently underwent assisted rupture of membranes with clear fluid and received epidural for pain management.  She made it to complete cervical dilation and did not require any Pitocin augmentation.  She was allowed to labor down for approximately 1 hour at which time she was found to be +3 and pushed for approximately 20 minutes with category 1  tracing.  She delivered a female infant in the SHERRY presentation with nuchal cord x1 that was reduced on the perineum.  The remainder of the infant delivered atraumatically and a delayed cord clamp was performed.  The cord was then clamped by myself and cut by myself.  The placenta delivered spontaneously, Schultze presentation, intact with a 3-vessel cord and the fundus was found to be firm.  The perineum was intact and there was a left labial laceration that was repaired with 3-0 Vicryl in the usual fashion.  QBL was not done and therefore EBL was found to be 300 mL.  All counts were correct and she will be transferred to PACU in stable condition.         FILEMON QURESHI MD             D: 2018   T: 2018   MT: FEI      Name:     REY HOPKINS   MRN:      [id number]        Account:        [id number]   :      1985        Delivery Date:  2018               Document: [id number]

## 2018-08-25 NOTE — PLAN OF CARE
Problem: Patient Care Overview  Goal: Plan of Care/Patient Progress Review  Outcome: Improving  Transferred to 449 per wheelchair from L&D at 2015. Oreinted to room and surroundings. Up to BR with SBA, voided without difficulty at 2130. Has breast fed successfully prior to transfer, denies pain at this time. FOB present and supportive, involved in infant cares.

## 2018-08-25 NOTE — LACTATION NOTE
"Lactation visit. Patient had  latched at time of visit. She was unsure of comfort level, \"it doesn't really hurt, but doesn't feel that good.\" [place] then began making sucking noises while 'latched.' Assisted with breaking seal and re-latching using proper latching techniques. Patient noted an increase in comfort with latch immediately. Nutritive sucking noted, with a couple of swallows. Demonstrated breast compression with patient and pointed out increased amounts of swallows. Also discussed hand expression post feeds with patient. Encouraged her to call for assistance PRN.   "

## 2018-08-26 VITALS
TEMPERATURE: 98.2 F | BODY MASS INDEX: 29.19 KG/M2 | WEIGHT: 171 LBS | HEIGHT: 64 IN | HEART RATE: 83 BPM | SYSTOLIC BLOOD PRESSURE: 109 MMHG | DIASTOLIC BLOOD PRESSURE: 67 MMHG | RESPIRATION RATE: 16 BRPM

## 2018-08-26 PROCEDURE — 25000132 ZZH RX MED GY IP 250 OP 250 PS 637: Performed by: OBSTETRICS & GYNECOLOGY

## 2018-08-26 RX ADMIN — IBUPROFEN 800 MG: 800 TABLET ORAL at 01:15

## 2018-08-26 RX ADMIN — IBUPROFEN 800 MG: 800 TABLET ORAL at 08:36

## 2018-08-26 RX ADMIN — SENNOSIDES AND DOCUSATE SODIUM 1 TABLET: 8.6; 5 TABLET ORAL at 08:36

## 2018-08-26 RX ADMIN — FERROUS SULFATE TAB 325 MG (65 MG ELEMENTAL FE) 325 MG: 325 (65 FE) TAB at 08:37

## 2018-08-26 NOTE — PROGRESS NOTES
"August 26, 2018      SUBJECTIVE: No acute overnight events.  Mild abdominal cramping. +Lochia light.  Tolerating PO. Ambulating/urinating w/o difficulty.  Denies CP/palp/SOB/LH.    OBJECTIVE:  /67  Pulse 83  Temp 98.2  F (36.8  C) (Oral)  Resp 16  Ht 1.626 m (5' 4\")  Wt 77.6 kg (171 lb)  Breastfeeding? Unknown  BMI 29.35 kg/m2  Gen: NAD, A&O x 3  Abd: Uterus firm, contracted, NT  Ext: minimal edema BL LEs, symmetric, no CT    Hemoglobin   Date Value Ref Range Status   08/25/2018 9.0 (L) 11.7 - 15.7 g/dL Final   11/12/2016 11.6 (L) 11.7 - 15.7 g/dL Final   ]    A/P: PPD#2 s/p normal vaginal delivery.  Doing well.  Afebrile, VSS.  - ibuprofen, tylenol prn pain  - breastfeeding  - regular diet as tolerated  - routine postpartum care  - anemia: ferrous sulfate BID.       BLAS PRITCHETT MD    "

## 2018-08-26 NOTE — PLAN OF CARE
Problem: Patient Care Overview  Goal: Plan of Care/Patient Progress Review  Outcome: Adequate for Discharge Date Met: 08/26/18  Patient vitally stable, tolerating regular diet, voiding without issue, ambulating independently. Postpartum checks WDL. Patient states perineal pain in minimal, prn Ibuprofen given with stated management of pain. Utilizing ice to perineum with kelly-cares. Breastfeeding independently.  present and supportive.     Discharge instructions reviewed with patient. Patient states no additional questions at this time. Patient discharged at 1220.

## 2018-08-26 NOTE — PLAN OF CARE
Problem: Patient Care Overview  Goal: Plan of Care/Patient Progress Review  Outcome: Improving  VSS. Using ibuprofen. SO at bedside, very supportive. Mostly independent with breastfeeding.

## 2018-08-26 NOTE — PLAN OF CARE
Problem: Patient Care Overview  Goal: Plan of Care/Patient Progress Review  Outcome: Improving  Doing well with self and infant cares, breast feeding independently. Ibuprofen for pain with stated relief, denies difficulty voiding. FOB present and supportive, involved in infant cares.

## 2018-08-26 NOTE — DISCHARGE INSTRUCTIONS
You should be on pelvic rest with no heavy lifting >25 lb for 6 weeks.  Please call or return if you have fever >/= 100.4 degrees Farenheit (38.0 degrees Celsius), severe worsening abdominal pain not relieved by oral pain medications, heavy persistent vaginal bleeding, chest pain, palpitations, shortness of breath, dizziness, depressed mood, or for any other major concern.  You may use over the counter ibuprofen (400 mg every 4 hours) and tylenol (500 mg every 4 hours) as needed for pain.  You may also use stool softener (Colace/Docusate 100 mg 1-2 tabs per day) as needed for constipation.  These are safe with breastfeeding.  You should take ferrous sulfate 325 mg twice a day (iron supplement) for 2-3 weeks for anemia.      Please call the office to schedule a routine postpartum examination in 6 weeks, or sooner if instructed so by your physician.  If you had gestational diabetes during pregnancy, then you must also schedule a 2 hour glucose test during your postpartum examination.        Lactation: [phone]     [facility]: [phone]    Please schedule a follow up appointment with your OBGYN in 6 weeks.     Postpartum Vaginal Delivery Instructions    Activity       Ask family and friends for help when you need it.    Do not place anything in your vagina for 6 weeks.    You are not restricted on other activities, but take it easy for a few weeks to allow your body to recover from delivery.  You are able to do any activities you feel up to that point.    No driving until you have stopped taking your pain medications (usually two weeks after delivery).     Call your health care provider if you have any of these symptoms:       Increased pain, swelling, redness, or fluid around your stiches from an episiotomy or perineal tear.    A fever above 100.4 F (38 C) with or without chills when placing a thermometer under your tongue.    You soak a sanitary pad with blood within 1 hour, or you see blood clots  larger than a golf ball.    Bleeding that lasts more than 6 weeks.    Vaginal discharge that smells bad.    Severe pain, cramping or tenderness in your lower belly area.    A need to urinate more frequently (use the toilet more often), more urgently (use the toilet very quickly), or it burns when you urinate.    Nausea and vomiting.    Redness, swelling or pain around a vein in your leg.    Problems breastfeeding or a red or painful area on your breast.    Chest pain and cough or are gasping for air.    Problems coping with sadness, anxiety, or depression.  If you have any concerns about hurting yourself or the baby, call your provider immediately.     You have questions or concerns after you return home.     Keep your hands clean:  Always wash your hands before touching your perineal area and stitches.  This helps reduce your risk of infection.  If your hands aren't dirty, you may use an alcohol hand-rub to clean your hands. Keep your nails clean and short.

## 2019-09-30 ENCOUNTER — HEALTH MAINTENANCE LETTER (OUTPATIENT)
Age: 34
End: 2019-09-30

## 2020-07-05 ENCOUNTER — HOSPITAL ENCOUNTER (OUTPATIENT)
Dept: ULTRASOUND IMAGING | Facility: CLINIC | Age: 35
Discharge: HOME | End: 2020-07-05
Attending: OBSTETRICS & GYNECOLOGY | Admitting: OBSTETRICS & GYNECOLOGY
Payer: COMMERCIAL

## 2020-07-05 DIAGNOSIS — N97.9 INFERTILITY, FEMALE: ICD-10-CM

## 2020-07-05 PROCEDURE — 76830 TRANSVAGINAL US NON-OB: CPT

## 2020-10-21 ENCOUNTER — TRANSFERRED RECORDS (OUTPATIENT)
Dept: HEALTH INFORMATION MANAGEMENT | Facility: CLINIC | Age: 35
End: 2020-10-21

## 2020-11-17 ENCOUNTER — TRANSCRIBE ORDERS (OUTPATIENT)
Dept: MATERNAL FETAL MEDICINE | Facility: CLINIC | Age: 35
End: 2020-11-17

## 2020-11-17 ENCOUNTER — PRE VISIT (OUTPATIENT)
Dept: MATERNAL FETAL MEDICINE | Facility: CLINIC | Age: 35
End: 2020-11-17

## 2020-11-17 DIAGNOSIS — O26.90 PREGNANCY RELATED CONDITION, ANTEPARTUM: Primary | ICD-10-CM

## 2020-11-18 ENCOUNTER — HOSPITAL ENCOUNTER (OUTPATIENT)
Dept: ULTRASOUND IMAGING | Facility: CLINIC | Age: 35
End: 2020-11-18
Attending: OBSTETRICS & GYNECOLOGY
Payer: COMMERCIAL

## 2020-11-18 ENCOUNTER — OFFICE VISIT (OUTPATIENT)
Dept: MATERNAL FETAL MEDICINE | Facility: CLINIC | Age: 35
End: 2020-11-18
Attending: OBSTETRICS & GYNECOLOGY
Payer: COMMERCIAL

## 2020-11-18 DIAGNOSIS — O26.90 PREGNANCY RELATED CONDITION, ANTEPARTUM: ICD-10-CM

## 2020-11-18 DIAGNOSIS — O35.9XX0 SUSPECTED FETAL ANOMALY, ANTEPARTUM, SINGLE OR UNSPECIFIED FETUS: Primary | ICD-10-CM

## 2020-11-18 PROCEDURE — 76811 OB US DETAILED SNGL FETUS: CPT

## 2020-11-18 PROCEDURE — 99205 OFFICE O/P NEW HI 60 MIN: CPT | Mod: 25 | Performed by: OBSTETRICS & GYNECOLOGY

## 2020-11-18 PROCEDURE — 76811 OB US DETAILED SNGL FETUS: CPT | Mod: 26 | Performed by: OBSTETRICS & GYNECOLOGY

## 2020-11-19 NOTE — PROGRESS NOTES
The patient was seen for an ultrasound in the [facility] at the [facility] today.  For a detailed report of the ultrasound examination, please see the ultrasound report which can be found under the imaging tab.    Shilpa Finley MD  , OB/GYN  Maternal-Fetal Medicine  [phone] (Pager)

## 2020-11-20 ENCOUNTER — HOSPITAL ENCOUNTER (OUTPATIENT)
Dept: CARDIOLOGY | Facility: CLINIC | Age: 35
Discharge: HOME | End: 2020-11-20
Attending: OBSTETRICS & GYNECOLOGY | Admitting: OBSTETRICS & GYNECOLOGY
Payer: COMMERCIAL

## 2020-11-20 DIAGNOSIS — O35.9XX0 SUSPECTED FETAL ANOMALY, ANTEPARTUM, SINGLE OR UNSPECIFIED FETUS: ICD-10-CM

## 2020-11-20 PROCEDURE — 76827 ECHO EXAM OF FETAL HEART: CPT | Mod: 26 | Performed by: PEDIATRICS

## 2020-11-20 PROCEDURE — 76825 ECHO EXAM OF FETAL HEART: CPT | Mod: 26 | Performed by: PEDIATRICS

## 2020-11-20 PROCEDURE — 93325 DOPPLER ECHO COLOR FLOW MAPG: CPT

## 2020-11-20 PROCEDURE — 93325 DOPPLER ECHO COLOR FLOW MAPG: CPT | Mod: 26 | Performed by: PEDIATRICS

## 2020-11-23 ENCOUNTER — HOSPITAL ENCOUNTER (OUTPATIENT)
Dept: ULTRASOUND IMAGING | Facility: CLINIC | Age: 35
End: 2020-11-23
Attending: OBSTETRICS & GYNECOLOGY
Payer: COMMERCIAL

## 2020-11-23 ENCOUNTER — OFFICE VISIT (OUTPATIENT)
Dept: MATERNAL FETAL MEDICINE | Facility: CLINIC | Age: 35
End: 2020-11-23
Attending: OBSTETRICS & GYNECOLOGY
Payer: COMMERCIAL

## 2020-11-23 DIAGNOSIS — O26.90 PREGNANCY RELATED CONDITION, ANTEPARTUM: Primary | ICD-10-CM

## 2020-11-23 DIAGNOSIS — O26.90 PREGNANCY RELATED CONDITION, ANTEPARTUM: ICD-10-CM

## 2020-11-23 DIAGNOSIS — O28.3 ABNORMAL PRENATAL ULTRASOUND: ICD-10-CM

## 2020-11-23 DIAGNOSIS — O35.9XX0 SUSPECTED FETAL ANOMALY, ANTEPARTUM, SINGLE OR UNSPECIFIED FETUS: Primary | ICD-10-CM

## 2020-11-23 DIAGNOSIS — O35.9XX0 SUSPECTED FETAL ANOMALY, ANTEPARTUM, SINGLE OR UNSPECIFIED FETUS: ICD-10-CM

## 2020-11-23 PROCEDURE — 88285 CHROMOSOME COUNT ADDITIONAL: CPT | Mod: TC | Performed by: OBSTETRICS & GYNECOLOGY

## 2020-11-23 PROCEDURE — 76946 ECHO GUIDE FOR AMNIOCENTESIS: CPT

## 2020-11-23 PROCEDURE — 36415 COLL VENOUS BLD VENIPUNCTURE: CPT

## 2020-11-23 PROCEDURE — 59000 AMNIOCENTESIS DIAGNOSTIC: CPT | Performed by: OBSTETRICS & GYNECOLOGY

## 2020-11-23 PROCEDURE — 76946 ECHO GUIDE FOR AMNIOCENTESIS: CPT | Mod: 26 | Performed by: OBSTETRICS & GYNECOLOGY

## 2020-11-23 PROCEDURE — 96040 HC GENETIC COUNSELING, EACH 30 MINUTES: CPT | Performed by: GENETIC COUNSELOR, MS

## 2020-11-23 PROCEDURE — 999N001161 HC STATISTIC MATERNAL CELL CONTAM MOLEC: Performed by: OBSTETRICS & GYNECOLOGY

## 2020-11-23 PROCEDURE — 76815 OB US LIMITED FETUS(S): CPT | Mod: 26 | Performed by: OBSTETRICS & GYNECOLOGY

## 2020-11-23 PROCEDURE — 88235 TISSUE CULTURE PLACENTA: CPT | Mod: TC | Performed by: OBSTETRICS & GYNECOLOGY

## 2020-11-23 PROCEDURE — 88280 CHROMOSOME KARYOTYPE STUDY: CPT | Performed by: OBSTETRICS & GYNECOLOGY

## 2020-11-23 PROCEDURE — 88291 CYTO/MOLECULAR REPORT: CPT | Performed by: MEDICAL GENETICS

## 2020-11-23 PROCEDURE — 88271 CYTOGENETICS DNA PROBE: CPT | Performed by: OBSTETRICS & GYNECOLOGY

## 2020-11-23 PROCEDURE — 59000 AMNIOCENTESIS DIAGNOSTIC: CPT

## 2020-11-23 PROCEDURE — 76815 OB US LIMITED FETUS(S): CPT

## 2020-11-23 PROCEDURE — 81265 STR MARKERS SPECIMEN ANAL: CPT | Performed by: OBSTETRICS & GYNECOLOGY

## 2020-11-23 PROCEDURE — 88275 CYTOGENETICS 100-300: CPT | Performed by: OBSTETRICS & GYNECOLOGY

## 2020-11-23 PROCEDURE — 81229 CYTOG ALYS CHRML ABNR SNPCGH: CPT | Performed by: OBSTETRICS & GYNECOLOGY

## 2020-11-23 PROCEDURE — 88269 CHROMOSOME ANALYS AMNIOTIC: CPT | Mod: TC | Performed by: OBSTETRICS & GYNECOLOGY

## 2020-11-23 NOTE — NURSING NOTE
Pt here for amniocentesis d/t fetal cardiac abnormality. Saw Tamica Hayward [facility], see their dictation.  After consent signed and TimeOut completed, Dr. Zheng withdrew adequate fluid x1 transabdominal pass with Dr. Robles at bedside for assistance and supervision. Patient reports 1/10 pain, states minimal cramping.  Pt is RH positive.  MD reviewed blood type and screen and Rhogam not indicated.  Discharge teaching completed and questions answered. Patient will schedule a 4 week follow up ultrasound for growth. Pt discharged ambulatory and stable.   Lab alerted by calling phone number on amnio work-aid for [facility], spoke with Lissette.  [employer] notified of specimen by [facility].  Specimen transported to main lab, warm hand-off completed.

## 2020-11-23 NOTE — PROCEDURES
Amniocentesis  Start 10:27 AM. End 10:29 AM  Instrument: TA 22G needle. Insertion site: lower abdomen right side. Method: transplacental. Entries uterus: 1  Sample: obtained. Sample amount 40 ml. Sample quality: clear yellow  Sample identification confirmed  After informed consent was obtained and Time Out completed, the patient was prepped in the usual fashion. 40cc of clear yellow fluid were obtained via a single trans-placental insertion of a 22-gauge needle under direct continuous ultrasound guidance.  The first 1-2cc of fluid were discarded to avoid maternal cell contamination.  Fluid will be sent for FISH, Karyotype and C[facility] Microarray.  Normal cardiac activity was confirmed after the procedure.  The results of these tests will be forwarded to you as soon as they become available.  Evaluation  Post cardiac activity: present, normal. FHR post 152 bpm

## 2020-11-23 NOTE — PROGRESS NOTES
"Please see \"imaging\" tab under \"Chart Review\" for details of today's US at the [facility][1].    Shabbir Robles MD  Maternal-Fetal Medicine    "

## 2020-11-23 NOTE — PROGRESS NOTES
[facility] [place]  Genetic Counseling Consult    Patient:  Shilpa Barboza YOB: 1985   Date of Service:  20      Shilpa Barboza was seen at the [facility] for genetic consultation as part of her appointment for ultrasound and amniocentesis procedure due to fetal cardiac abnormality.        Impression/Plan:   1. Shilpa has not had serum screening in this pregnancy. Fetal ardiac abnormality (Ebstein anomaly) was identified in the current pregnancy on prenatal ultrasound and fetal echocardiogram (see echo report from ). Today we reviewed this finding and available screening and diagnostic testing options for fetal chromosome abnormalities. The patient declines NIPT and has elected to pursue genetic amniocentesis. The following testing was ordered on the prenatal sample: FISH preliminary analysis, final chromosome analysis and microarray.  Results are expected within 24-48 hours for FISH results and within 14 days for final chromosome analysis and microarray. Results will be available in [employer].  We will contact her to discuss the results, and a copy will be forwarded to the office of the referring OB provider. Patient requested delay on release of result to [employer] and desires to be contacted by genetic counseling to review first. Please see today's ultrasound report for further details regarding ongoing pregnancy management.     Pregnancy History:   /Parity:    Age at Delivery: 35 year old  KATE: 2021, by Ultrasound  Gestational Age: 20w5d    No significant complications or exposures were reported in the current pregnancy.    Shilpa oneal pregnancy history is significant for one early SAB around 8 weeks requiring D&C and one term vaginal delivery of a healthy female.    Medical History:   Shilpa has a history of hypothyroidism managed with levothyroxine, PCOS and anemia previously requiring  iron infusions.        Family History:   A three-generation pedigree was obtained, and is scanned under the  Media  tab.   The following significant findings were reported by Shilpa:    Shilpa's partner, Breezy is 36 and now healthy. He was reported to have a history of juvenile arthritis. We reviewed that juvenile arthritis is thought to be multifactorial, resulting from a combination of genetic and environmental factors. Given that Breezy is a first degree relative to the couple's children, their risk may be increased and Shilpa was encouraged to share this family history information with their pediatrician.     Otherwise, the reported family history is negative for multiple miscarriages, stillbirths, birth defects, intellectual disability, known genetic conditions, and consanguinity.       Carrier Screening:   The patient reports that she and the father of the pregnancy have  ancestry:     Cystic fibrosis is an autosomal recessive genetic condition that occurs with increased frequency in individuals of  ancestry and carrier screening for this condition is available.  In addition,  screening in the [employer] includes cystic fibrosis.      Expanded carrier screening for mutations in a large panel of genes associated with autosomal recessive conditions including cystic fibrosis, spinal muscular atrophy, and others, is now available.      The patient is aware that carrier screening options will remain available to the couple.        Risk Assessment for Chromosome Conditions:   We explained that the risk for fetal chromosome abnormalities increases with maternal age. We discussed specific features of common chromosome abnormalities, including Down syndrome, trisomy 13, trisomy 18, and sex chromosome trisomies.      - At age 35 at midtrimester, the risk to have a baby with Down syndrome is 1 in 421.     - At age 35 at midtrimester, the risk to have a baby with any chromosome abnormality  is 1 in 217.      Shilpa did not have maternal serum screening earlier in pregnancy.       The current pregnancy was identified to have Ebstein anomaly on prenatal ultrasound and fetal echocardiogram (see echo report from 11/20). Ebstein anomaly is a malformation of the tricuspid valve that occurs due to improper development of the tricuspid valve in the first eight weeks of fetal growth. It is thought that many factors may play a role in the development of Ebstein anomaly, including genetic and non genetic factors. The majority of cases of Ebstein anomaly occur sporadically, however it can be seen in the setting of an underlying genetic etiology such as a syndrome, chromosome abnormality, copy number variant, or single gene disorder.       Choroid plexus cyst was previously noted on prenatal ultrasound. This finding is generally not associated with any medical problems involving the brain and would not be expected to need medical treatment itself. However, some studies have suggested that this ultrasound finding is thought to be seen with a higher frequency in pregnancies with a chromosome problem (particularly Trisomy 18), as compared to pregnancies that do not have a chromosome problem.    Testing Options:   We discussed the following options:   Non-invasive Prenatal Testing (NIPT)    Maternal plasma cell-free DNA testing; first trimester ultrasound with nuchal translucency and nasal bone assessment is recommended, when appropriate    Screens for fetal trisomy 21, trisomy 13, trisomy 18, and sex chromosome aneuploidy    Cannot screen for open neural tube defects; maternal serum AFP after 15 weeks is recommended    Genetic Amniocentesis    This is an invasive procedure typically performed at 15 weeks or later, through which amniotic fluid is obtained for the purpose of chromosome analysis and/or other prenatal genetic analysis.    Diagnostic results; >99% sensitivity for fetal chromosome abnormalities    The risk  of complications including pregnancy loss associated with amniocentesis is generally estimated to be 1/300-1/500.     After reviewing available screening and diagnostic testing options, Shilpa elected to pursue genetic amniocentesis as a means of collecting more information regarding the possibility of an underlying fetal chromosome abnormality as an explanation for the fetal cardiac anomaly in the current pregnancy. She shared that they are not considering termination of pregnancy and feel this information would be helpful in preparing for their daughter's arrival.     We reviewed the amniocentesis procedure consent form as well as the genetic testing consent form. All questions were answered to her apparent satisfaction. The following testing was ordered on the prenatal sample: FISH preliminary analysis, final chromosome analysis and microarray. AFAFP was declined. Results are expected within 24-48 hours for FISH results and within 14 days for final chromosome analysis and microarray. Results will be available in [employer].  We will contact her to discuss the results, and a copy will be forwarded to the office of the referring OB provider. Patient requested delay on release of result to [facility] and desires to be contacted by genetic counseling to review first.    We reviewed the benefits and limitations of this testing.  Screening tests provide a risk assessment specific to the pregnancy for certain fetal chromosome abnormalities, but cannot definitively diagnose or exclude a fetal chromosome abnormality.  Follow-up genetic counseling and consideration of diagnostic testing is recommended with any abnormal screening result.     Diagnostic tests carry inherent risks- including risk of miscarriage- that require careful consideration.  These tests can detect fetal chromosome abnormalities with greater than 99% certainty.  Results can be compromised by maternal cell contamination or mosaicism, and are limited by the  resolution of cytogenetic G-banding technology.  There is no screening nor diagnostic test that can detect all forms of birth defects or mental disability.    It was a pleasure to be involved with Shilpa oneal care. Face-to-face time of the meeting was 35 minutes.      Tamica Hayward MS, [facility]  Maternal Fetal Medicine  [facility]  Ph: [phone]  [email or web address]

## 2020-11-24 ENCOUNTER — TELEPHONE (OUTPATIENT)
Dept: MATERNAL FETAL MEDICINE | Facility: CLINIC | Age: 35
End: 2020-11-24

## 2020-11-24 LAB — COPATH REPORT: NORMAL

## 2020-11-24 NOTE — TELEPHONE ENCOUNTER
November 24, 2020    I called Shilpa to review available FISH result from her recent amniocentesis procedure. Shilpa elected to pursue amniocentesis due to the identified fetal cardiac abnormality (Ebstein anomaly) in the current preganncy.     FISH result is normal, revealing two signals for chromosomes 13, 18, 21, and X.    Final chromosome analysis and microarray are still pending and expected within 7-14 days. We will contact the patient as soon as this result becomes available.     Tamica Hayward MS, [facility]  Maternal Fetal Medicine  [facility]  Ph: [phone]  [email or web address]

## 2020-12-01 ENCOUNTER — TELEPHONE (OUTPATIENT)
Dept: MATERNAL FETAL MEDICINE | Facility: CLINIC | Age: 35
End: 2020-12-01

## 2020-12-01 LAB
CHROM ANALY RESULT (ISCN): NORMAL
PATHOLOGY STUDY: NORMAL
SERVICE CMNT-IMP: YES
SPECIMEN SOURCE: NORMAL

## 2020-12-01 NOTE — TELEPHONE ENCOUNTER
December 1, 2020    I called Shilpa to review available FISH result from her recent amniocentesis procedure. Shilpa elected to pursue amniocentesis due to the identified fetal cardiac abnormality (Ebstein anomaly) in the current preganncy. She was made aware of the normal FISH result on 11/24/2020.     Today we reviewed the available microarray analysis. Result revealed normal female, no clinically significant copy number changes or regions of homozygosity were detected.     We reviewed that all amniocentesis test results have been normal and very reassuring, final chromosome analysis is still pending. We will contact Shilpa as soon as these are available.     Tamica Hayward MS, [facility]  Maternal Fetal Medicine  [facility]  Ph: [phone]  [email or web address]

## 2020-12-03 LAB — COPATH REPORT: NORMAL

## 2020-12-04 ENCOUNTER — TELEPHONE (OUTPATIENT)
Dept: MATERNAL FETAL MEDICINE | Facility: CLINIC | Age: 35
End: 2020-12-04

## 2020-12-04 NOTE — TELEPHONE ENCOUNTER
December 4, 2020    I called Shilpa to review available final chromosome analysis result from her recent amniocentesis procedure. Shilpa elected to pursue amniocentesis due to the identified fetal cardiac abnormality (Ebstein anomaly) in the current preganncy. She was made aware of the normal FISH result on 11/24/2020 and normal microarray on 12/01/2020.      Today we reviewed the available final chromosome analysis. Result revealed 46,XX (normal female result), no numerical or major structural chromosomal abnormality was identified.     We discussed that this testing did provide an underlying chromosome explanation for the fetal cardiac abnormality. Shilpa inquired about the CPC identified on ultrasound. We reviewed that this specific finding in the setting of a normal chromosome analysis is interpreted as normal variation and we would not expect this to lead to any health concerns on it's own. Shilpa had no additional questions at this time and was happy to hear all testing has been normal. No additional testing is pending. All results are available in Shilpa's chart for review.      Tamica Hayward MS, [facility]  Maternal Fetal Medicine  [facility]  Ph: [phone]  [email or web address]

## 2020-12-14 PROBLEM — O35.BXX0 FETAL CARDIAC ANOMALY AFFECTING PREGNANCY, ANTEPARTUM: Status: ACTIVE | Noted: 2020-12-14

## 2020-12-18 ENCOUNTER — HOSPITAL ENCOUNTER (OUTPATIENT)
Dept: ULTRASOUND IMAGING | Facility: CLINIC | Age: 35
End: 2020-12-18
Attending: OBSTETRICS & GYNECOLOGY
Payer: COMMERCIAL

## 2020-12-18 ENCOUNTER — OFFICE VISIT (OUTPATIENT)
Dept: MATERNAL FETAL MEDICINE | Facility: CLINIC | Age: 35
End: 2020-12-18
Attending: OBSTETRICS & GYNECOLOGY
Payer: COMMERCIAL

## 2020-12-18 DIAGNOSIS — O35.BXX0 ANOMALY OF HEART OF FETUS AFFECTING PREGNANCY, ANTEPARTUM, SINGLE OR UNSPECIFIED FETUS: Primary | ICD-10-CM

## 2020-12-18 DIAGNOSIS — O35.9XX0 SUSPECTED FETAL ANOMALY, ANTEPARTUM, SINGLE OR UNSPECIFIED FETUS: ICD-10-CM

## 2020-12-18 DIAGNOSIS — O28.3 ABNORMAL PRENATAL ULTRASOUND: ICD-10-CM

## 2020-12-18 PROCEDURE — 76816 OB US FOLLOW-UP PER FETUS: CPT

## 2020-12-18 PROCEDURE — 76816 OB US FOLLOW-UP PER FETUS: CPT | Mod: 26 | Performed by: OBSTETRICS & GYNECOLOGY

## 2020-12-18 NOTE — PROGRESS NOTES
Please refer to ultrasound report under 'Imaging' Studies of 'Chart Review' tabs.    Anthony Mei M.D.

## 2020-12-21 ENCOUNTER — TELEPHONE (OUTPATIENT)
Dept: MATERNAL FETAL MEDICINE | Facility: CLINIC | Age: 35
End: 2020-12-21

## 2020-12-21 DIAGNOSIS — O35.BXX0 ANOMALY OF HEART OF FETUS AFFECTING PREGNANCY, ANTEPARTUM, SINGLE OR UNSPECIFIED FETUS: Primary | ICD-10-CM

## 2020-12-21 DIAGNOSIS — O35.BXX0 ANOMALY OF HEART OF FETUS AFFECTING PREGNANCY, ANTEPARTUM, SINGLE OR UNSPECIFIED FETUS: ICD-10-CM

## 2020-12-21 NOTE — TELEPHONE ENCOUNTER
Phone call to Shilpa to schedule f/u fetal echo for January per Dr. Zavala's recommendations. Pt agrees to 1/22 1000 in the [facility].    Kirstin Kelly RN

## 2021-01-05 LAB — MATERNAL CELL CONTAMINATION MOL ANALYSIS: NORMAL

## 2021-01-15 ENCOUNTER — HEALTH MAINTENANCE LETTER (OUTPATIENT)
Age: 36
End: 2021-01-15

## 2021-01-15 ENCOUNTER — HOSPITAL ENCOUNTER (OUTPATIENT)
Dept: ULTRASOUND IMAGING | Facility: CLINIC | Age: 36
End: 2021-01-15
Attending: OBSTETRICS & GYNECOLOGY
Payer: COMMERCIAL

## 2021-01-15 ENCOUNTER — OFFICE VISIT (OUTPATIENT)
Dept: MATERNAL FETAL MEDICINE | Facility: CLINIC | Age: 36
End: 2021-01-15
Attending: OBSTETRICS & GYNECOLOGY
Payer: COMMERCIAL

## 2021-01-15 DIAGNOSIS — O35.BXX0 ANOMALY OF HEART OF FETUS AFFECTING PREGNANCY, ANTEPARTUM, SINGLE OR UNSPECIFIED FETUS: Primary | ICD-10-CM

## 2021-01-15 PROCEDURE — 76816 OB US FOLLOW-UP PER FETUS: CPT | Mod: 26 | Performed by: OBSTETRICS & GYNECOLOGY

## 2021-01-15 PROCEDURE — 76816 OB US FOLLOW-UP PER FETUS: CPT

## 2021-01-16 NOTE — PROGRESS NOTES
Please see ultrasound report under Imaging tab for details of today's ultrasound.    Yvette Baldwin MD  Maternal-Fetal Medicine

## 2021-01-22 ENCOUNTER — HOSPITAL ENCOUNTER (OUTPATIENT)
Dept: CARDIOLOGY | Facility: CLINIC | Age: 36
Discharge: HOME | End: 2021-01-22
Attending: PEDIATRICS | Admitting: PEDIATRICS
Payer: COMMERCIAL

## 2021-01-22 ENCOUNTER — TELEPHONE (OUTPATIENT)
Dept: MATERNAL FETAL MEDICINE | Facility: CLINIC | Age: 36
End: 2021-01-22

## 2021-01-22 DIAGNOSIS — O35.BXX0 ANOMALY OF HEART OF FETUS AFFECTING PREGNANCY, ANTEPARTUM, SINGLE OR UNSPECIFIED FETUS: ICD-10-CM

## 2021-01-22 PROCEDURE — 76828 ECHO EXAM OF FETAL HEART: CPT | Mod: 26 | Performed by: PEDIATRICS

## 2021-01-22 PROCEDURE — 93325 DOPPLER ECHO COLOR FLOW MAPG: CPT

## 2021-01-22 PROCEDURE — 76826 ECHO EXAM OF FETAL HEART: CPT | Mod: 26 | Performed by: PEDIATRICS

## 2021-01-22 PROCEDURE — 93325 DOPPLER ECHO COLOR FLOW MAPG: CPT | Mod: 26 | Performed by: PEDIATRICS

## 2021-01-22 NOTE — PROGRESS NOTES
Fetal Cardiology Consultation    Patient:  Shilpa Barboza MRN:  [id number]   YOB: 1985 Age:  35 year old   Date of Visit:  2021 PCP:  Annie Nolen MD   KATE: 2021, by Ultrasound EGA: 29w2d weeks     Dear Doctor:    I had the pleasure of seeing Shilpa Barboza at the [facility] in [place] on 2021 in ongoing consultation for fetal echocardiography results. She presented today by herself. As you know, she is a 35 year old female with current pregnancy affected by fetal Ebstein's anomaly.    Today's fetal echocardiogram showed Ebstein's anomaly of the tricuspid valve with apical rotation/displacement of the septal leaflet. Severely dilated right atrium. Normal tricuspid valve annulus dimension. Severe tricuspid valve regurgitation. No antegrade flow across the mildly-hypoplastic pulmonary valve; no pulmonary insufficiency. Retrograde flow across a tortuous ductus arteriosus feeding confluent, normal caliber branch pulmonary arteries. Normal right and left ventricular systolic function. No evidence of diastolic dysfunction, no effusion.    I reviewed and interpreted the fetal echocardiogram today. Using pictures I discussed the anatomy, physiology, expected fetal course, and need for post- confirmation. The fetal cardiac anatomy is expected to be dependent on the ductus arteriosus after birth for pulmonary.    -- A follow-up fetal echocardiogram is recommended in 3-5 weeks.  -- A post- transthoracic echocardiogram is recommended immediately following delivery with pediatric cardiology consultation.  -- Delivery should occur at [facility].  -- Recommend consultation with the Pediatric Cardiothoracic Surgery service at [facility], preferably with her next M appointment in 3 weeks.    Thank you for allowing me to participate in Ms. Barboza's care. Please don't hesitate to contact me or the  Fetal Cardiology team at [facility] with any questions or concerns.     I spent a total of 20 minutes face-to-face with Ms. Barboza during today's office visit. Over 50% of this time was spent counseling the patient and/or coordinating care regarding the fetal echocardiography results.     Nadeem Zavala MD  Pediatric Cardiology  [facility]  Phone [phone]

## 2021-01-22 NOTE — TELEPHONE ENCOUNTER
CINTIA Massey to call RN coordinator at [facility] [phone]. Would like to discuss NICU/SW consult and schedule f/u fetal echo in 3-5 weeks. Plan for GISELLE at 36 weeks.       Kirstin Kelly RN

## 2021-01-25 DIAGNOSIS — O35.BXX0 ANOMALY OF HEART OF FETUS AFFECTING PREGNANCY, ANTEPARTUM, SINGLE OR UNSPECIFIED FETUS: Primary | ICD-10-CM

## 2021-02-01 ENCOUNTER — TELEPHONE (OUTPATIENT)
Dept: PEDIATRIC CARDIOLOGY | Facility: CLINIC | Age: 36
End: 2021-02-01

## 2021-02-01 ENCOUNTER — TRANSFERRED RECORDS (OUTPATIENT)
Dept: HEALTH INFORMATION MANAGEMENT | Facility: CLINIC | Age: 36
End: 2021-02-01

## 2021-02-01 NOTE — TELEPHONE ENCOUNTER
Contacted Shilpa to let her know that she will be seeing Dr Regan on 2/24/21 [facility] Consult.

## 2021-02-04 DIAGNOSIS — O35.BXX0 ANOMALY OF HEART OF FETUS AFFECTING PREGNANCY, ANTEPARTUM, SINGLE OR UNSPECIFIED FETUS: Primary | ICD-10-CM

## 2021-02-08 ENCOUNTER — VIRTUAL VISIT (OUTPATIENT)
Dept: MATERNAL FETAL MEDICINE | Facility: CLINIC | Age: 36
End: 2021-02-08
Attending: PEDIATRICS
Payer: COMMERCIAL

## 2021-02-08 DIAGNOSIS — O35.BXX0 ANOMALY OF HEART OF FETUS AFFECTING PREGNANCY, ANTEPARTUM, SINGLE OR UNSPECIFIED FETUS: ICD-10-CM

## 2021-02-08 PROCEDURE — 99204 OFFICE O/P NEW MOD 45 MIN: CPT | Mod: 95

## 2021-02-08 NOTE — PROGRESS NOTES
Shilpa is a 35 year old who is being evaluated via a billable video visit.   10 minutes of time in chart review, imaging review of ultrasound, and discussion with social work.    Video Start Time: 11:00        Video-Visit Details    Type of service:  Video Visit    Video End Time:11:35    Originating Location (pt. Location): Home    Distant Location (provider location):  [facility] [place]     Platform used for Video Visit: [facility]    NICU Consult    I had the pleasure of meeting with Mrs. Shilpa Barboza via virtual visit at the request of Dr. Liu of the Maternal Fetal Medicine Service at [facility].  She was accompanied on this visit by her  Breezy.  Tara Quinones from Maternal Child Health Social Work was also present on this visit.  Mrs. Barboza is currently 32 weeks pregnant with a female fetus with the probable diagnosis of Ebstein's anomaly.   She has undergone amniocentesis testing which showed a 46 XX fetus with a normal microarray.    We had the opportunity to review the  resuscitation team that will be present at her delivery.   I described the cardiopulmonary assistance that may be needed for an infant with complex cardiac disease.  I described the admission process to the NICU including the placement of central venous and arterial catheters.  We discussed the cardiac course including an urgent post-zia ECHO, consultation with pediatric cardiology, the potential for administration of prostaglandin, and close collaboration with the entire heart center team.  We discussed the surgical planning process should there need to be intervention in the  period.    We discussed the layout and makeup of the NICU team.  We discussed the layers of support in the NICU including social work, lactation, OT, and chaplaincy. I described the process for transfer to CVICU if surgery becomes necessary.  Finally, we reviewed  the COVID visiting restrictions and all questions were answered to the best of my ability.  We look forward to caring for the infant of Ms. Shilpa Barboza.  Please contact me if there are any additional questions.      Phoebe Bailey MD  Neonatology

## 2021-02-11 ENCOUNTER — TELEPHONE (OUTPATIENT)
Dept: CARE COORDINATION | Facility: CLINIC | Age: 36
End: 2021-02-11

## 2021-02-11 NOTE — TELEPHONE ENCOUNTER
JUDITH participated in NICU consult on 2/8/2021 in collaboration with MD Bailey.  Pt is currently at 32w1d gestation with a baby girl who is anticipated to have Ebstein's anomally.  During our call, she requested SW contact information in preparation for their hospital stay. JUDITH sent the following email:    Hi Shilpa,    I m reaching out to remind you of my availability to you as you anticipate a NICU stay at the [facility].  A few things to know before your stay:    1. Parking: If you are going to leave the parking ramp more than once, I highly recommend purchasing a parking pass.  They are available at the  of the [facility] and can be purchased anytime after you arrive.  A 5-exit pass is $25, and they also have unlimited weekly ($33) and monthly ($75).  For a reference point, I think if you were to park a car here and stay overnight for a normal postpartum stay, the undiscounted rate for the ramp is something like $26   2. Meals: Your meals will, of course, be covered while you are inpatient; Breezy can also have complimentary lunches and dinners during your stay by using the code  [employer]  when he orders.  After you discharge, if your baby is still here in the hospital, both you and Breezy will receive complimentary lunches and dinners using the code  [employer].  This is new since COVID and is available to all families regardless of need.    I am available to you for these kinds of logistical issues, as well as emotional check-ins, referrals for condition-specific resources, mental health support referrals, and to support your general well-being while here in the hospital.  Let me know if there s anything you think of in advance of your stay here that I can help you navigate.      Tara Lynn, [facility]  Clinical   [facility]  Maternal Child Health  [address]  [address]  [place] [address]  [email or web address]  [email or web address]   Office: [phone]   Pager: [phone]  Gender pronouns: she/her  Employed by [employer]

## 2021-02-12 ENCOUNTER — TELEPHONE (OUTPATIENT)
Dept: MATERNAL FETAL MEDICINE | Facility: CLINIC | Age: 36
End: 2021-02-12

## 2021-02-12 ENCOUNTER — OFFICE VISIT (OUTPATIENT)
Dept: MATERNAL FETAL MEDICINE | Facility: CLINIC | Age: 36
End: 2021-02-12
Attending: OBSTETRICS & GYNECOLOGY
Payer: COMMERCIAL

## 2021-02-12 ENCOUNTER — HOSPITAL ENCOUNTER (OUTPATIENT)
Dept: ULTRASOUND IMAGING | Facility: CLINIC | Age: 36
End: 2021-02-12
Attending: OBSTETRICS & GYNECOLOGY
Payer: COMMERCIAL

## 2021-02-12 DIAGNOSIS — O35.BXX0 ANOMALY OF HEART OF FETUS AFFECTING PREGNANCY, ANTEPARTUM, SINGLE OR UNSPECIFIED FETUS: Primary | ICD-10-CM

## 2021-02-12 DIAGNOSIS — O35.BXX0 ANOMALY OF HEART OF FETUS AFFECTING PREGNANCY, ANTEPARTUM, SINGLE OR UNSPECIFIED FETUS: ICD-10-CM

## 2021-02-12 PROCEDURE — 76819 FETAL BIOPHYS PROFIL W/O NST: CPT

## 2021-02-12 PROCEDURE — 76819 FETAL BIOPHYS PROFIL W/O NST: CPT | Mod: 26 | Performed by: OBSTETRICS & GYNECOLOGY

## 2021-02-12 PROCEDURE — 76816 OB US FOLLOW-UP PER FETUS: CPT

## 2021-02-12 PROCEDURE — 76816 OB US FOLLOW-UP PER FETUS: CPT | Mod: 26 | Performed by: OBSTETRICS & GYNECOLOGY

## 2021-02-12 NOTE — PROGRESS NOTES
"Please see \"Imaging\" tab under Chart Review for full details.    Nika Navarro MD  Maternal Fetal Medicine    "

## 2021-02-12 NOTE — TELEPHONE ENCOUNTER
Phone call to Shilpa to discuss GISELLE plan to [facility]. Pt will continue M ultrasounds at the [facility]. Pt is scheduled weekly at [facility] beginning 2/24. GISELLE OB visit scheduled for 3/1. Pt will see [facility] on 2/15 and then return to [facility] for postpartum care. Map and info for [facility] emailed to patient.       Kirstin Kelly RN

## 2021-02-17 ENCOUNTER — HOSPITAL ENCOUNTER (OUTPATIENT)
Dept: ULTRASOUND IMAGING | Facility: CLINIC | Age: 36
End: 2021-02-17
Attending: OBSTETRICS & GYNECOLOGY
Payer: COMMERCIAL

## 2021-02-17 ENCOUNTER — OFFICE VISIT (OUTPATIENT)
Dept: MATERNAL FETAL MEDICINE | Facility: CLINIC | Age: 36
End: 2021-02-17
Attending: OBSTETRICS & GYNECOLOGY
Payer: COMMERCIAL

## 2021-02-17 DIAGNOSIS — O35.BXX0 ANOMALY OF HEART OF FETUS AFFECTING PREGNANCY, ANTEPARTUM, SINGLE OR UNSPECIFIED FETUS: ICD-10-CM

## 2021-02-17 DIAGNOSIS — O35.BXX0 ANOMALY OF HEART OF FETUS AFFECTING PREGNANCY, ANTEPARTUM, SINGLE OR UNSPECIFIED FETUS: Primary | ICD-10-CM

## 2021-02-17 PROCEDURE — 76819 FETAL BIOPHYS PROFIL W/O NST: CPT

## 2021-02-17 PROCEDURE — 76819 FETAL BIOPHYS PROFIL W/O NST: CPT | Mod: 26 | Performed by: OBSTETRICS & GYNECOLOGY

## 2021-02-23 NOTE — PROGRESS NOTES
Reviewed prenatal findings with pediatric genetics, IP Genetics Consult after delivery recommended on genetics meeting 2/23, orders in fetal chart.    Nikole Saez MS, [facility]  Maternal Fetal Medicine

## 2021-02-24 ENCOUNTER — OFFICE VISIT (OUTPATIENT)
Dept: PEDIATRIC CARDIOLOGY | Facility: CLINIC | Age: 36
End: 2021-02-24
Attending: THORACIC SURGERY (CARDIOTHORACIC VASCULAR SURGERY)
Payer: COMMERCIAL

## 2021-02-24 ENCOUNTER — OFFICE VISIT (OUTPATIENT)
Dept: CARDIOLOGY | Facility: CLINIC | Age: 36
End: 2021-02-24
Payer: COMMERCIAL

## 2021-02-24 ENCOUNTER — HOSPITAL ENCOUNTER (OUTPATIENT)
Dept: CARDIOLOGY | Facility: CLINIC | Age: 36
End: 2021-02-24
Attending: PEDIATRICS
Payer: COMMERCIAL

## 2021-02-24 ENCOUNTER — OFFICE VISIT (OUTPATIENT)
Dept: MATERNAL FETAL MEDICINE | Facility: CLINIC | Age: 36
End: 2021-02-24
Attending: PEDIATRICS
Payer: COMMERCIAL

## 2021-02-24 ENCOUNTER — HOSPITAL ENCOUNTER (OUTPATIENT)
Dept: ULTRASOUND IMAGING | Facility: CLINIC | Age: 36
End: 2021-02-24
Attending: PEDIATRICS
Payer: COMMERCIAL

## 2021-02-24 DIAGNOSIS — O35.BXX0 ANOMALY OF HEART OF FETUS AFFECTING PREGNANCY, ANTEPARTUM, SINGLE OR UNSPECIFIED FETUS: ICD-10-CM

## 2021-02-24 DIAGNOSIS — O35.BXX0 ANOMALY OF HEART OF FETUS AFFECTING PREGNANCY, ANTEPARTUM, SINGLE OR UNSPECIFIED FETUS: Primary | ICD-10-CM

## 2021-02-24 DIAGNOSIS — O28.3 ABNORMAL FETAL ULTRASOUND: Primary | ICD-10-CM

## 2021-02-24 PROCEDURE — 99203 OFFICE O/P NEW LOW 30 MIN: CPT | Performed by: THORACIC SURGERY (CARDIOTHORACIC VASCULAR SURGERY)

## 2021-02-24 PROCEDURE — 76828 ECHO EXAM OF FETAL HEART: CPT | Mod: 26 | Performed by: PEDIATRICS

## 2021-02-24 PROCEDURE — 76819 FETAL BIOPHYS PROFIL W/O NST: CPT

## 2021-02-24 PROCEDURE — 76826 ECHO EXAM OF FETAL HEART: CPT | Mod: 26 | Performed by: PEDIATRICS

## 2021-02-24 PROCEDURE — 93325 DOPPLER ECHO COLOR FLOW MAPG: CPT | Mod: 26 | Performed by: PEDIATRICS

## 2021-02-24 PROCEDURE — 99202 OFFICE O/P NEW SF 15 MIN: CPT | Mod: 25 | Performed by: PEDIATRICS

## 2021-02-24 PROCEDURE — G0463 HOSPITAL OUTPT CLINIC VISIT: HCPCS | Mod: 25

## 2021-02-24 PROCEDURE — 76819 FETAL BIOPHYS PROFIL W/O NST: CPT | Mod: 26 | Performed by: OBSTETRICS & GYNECOLOGY

## 2021-02-24 PROCEDURE — 93325 DOPPLER ECHO COLOR FLOW MAPG: CPT

## 2021-02-24 NOTE — PATIENT INSTRUCTIONS
[facility]  EXPLORER CLINIC  12TH FLR,EAST BLD  [address] [id number] [phone]      Cardiology Clinic   RN Care Coordinators, Trina Fair (Bre) or Abena Waters  [phone]  Pediatric Call Center/Scheduling  [phone]    After Hours and Emergency Contact Number  [phone]  * Ask for the pediatric cardiologist on call         Prescription Renewals  The pharmacy must fax requests to [phone]  * Please allow 3-4 days for prescriptions to be authorized

## 2021-02-24 NOTE — NURSING NOTE
RN coordinator met with patient and  to review POC. New pt folder, map, contact card and information for [facility] and [facility] reviewed and given to patient. Pt will begin 2x/week hydrops/arrhythmia checks on 3/1 per cardiology request. Pt is scheduled for GISELLE visit on 3/1 to [facility]. Pt also met with Dr. Regan today.       Kirstin Kelly RN

## 2021-02-24 NOTE — PROGRESS NOTES
Fetal Cardiology Consult    Date of Visit:   2021  Gestational Age: 34 weeks  Due Date:  2021  Delivery:  [facility]      Dear Dr. Trujillo    I had the opportunity to meet with Shilpa and her partner today for a Fetal Cardiology Consult and Fetal Echocardiography.    Fetal Echo demonstrated Ebstein's anomaly of the tricuspid valve with apical rotation/displacement of the septal leaflet. Severely dilated right atrium. Severe tricuspid valve regurgitation. No antegrade flow across the mildly hypoplastic pulmonary valve; no pulmonary insufficiency. Retrograde flow across a tortuous ductus arteriosus feeding confluent, normal caliber branch pulmonary arteries. Low normal right and left ventricular systolic function. No hydrops. No arrhythmias. Technically difficult study due to late gestational age. Heart rate was regular. No SVT.  Cardiomegaly.     I have reviewed the abnormal Fetal Echo findings.  I drew a picture of a normal heart and compared it to that of Ebstein's anomaly. This is a prostaglandin dependent ductal lesion.    The parents had appropriate questions. I did my best to answer their questions.    Plan:    No additional fetal echocardiograms are recommended.      cardiothoracic surgery consultation is recommended.     The fetal cardiac condition is ductal dependent and prostaglandin therapy should be commenced immediately after delivery.    Cardiology consultation and echocardiogram is recommended immediately after delivery.     Bi-weekly fetal monitoring by biophysical profile is recommended.       Thank you for allowing me to participate in Shilpa's care.  Feel free to contact me with questions.    I spend 15 minutes counseling the patient about her fetal echocardiogram findings. All of this time was face to face.    Dr Zaira Dow  Pediatric Cardiologist  [facility]  Phone [phone]

## 2021-02-24 NOTE — PROGRESS NOTES
"Please see \"Imaging\" tab under \"Chart Review\" for details of today's US.    Ansley Trujillo, DO    "

## 2021-02-24 NOTE — LETTER
2/24/2021      RE: Shilpa Barboza  [address] [facility] [address]       REFERRAL SOURCE: Pediatric Cardiology Team/MFM Team    CHIEF COMPLAINT/PURPOSE OF VISIT: Fetal Diagnosis of Ebstein's Malformation    HISTORY OF PRESENT ILLNESS:   Ms. 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 No hydrops or history of arrhythmias. Pregnancy has been otherwise uncomplicated.         ASSESSMENT/PLAN:  #1 Abnormal Fetal Echocardiogram  #2 Fetal Diagnosis of Ebstein's Malformation     I discussed with Ms. Barboza and her  the echocardiographic findings and the plan after delivery. We discussed the nature of Ebstein malformation and it is not just an abnormality of the tricuspid valve but can extend to the right and or left ventricle as well. I dicussed the spectrum of the anomaly and the basic principles, indications and timing of interventions. I explained the concept of single ventricle vs two ventricle repair and the possibility of conversion from single to two ventricle repair. I explained that there several factors that will determine when and what to do after delivery, some of which is related to respiratory status, oxygen saturation, and ability to be fed. She understands the plan and we will follow-up with our MFM and pediatric cardiology teams for now and we will finalize the plan in more details after delivery. All questions were answered.            Bibi Regan MD

## 2021-02-24 NOTE — NURSING NOTE
Chief Complaint   Patient presents with     Consult     mfm     There were no vitals filed for this visit.  Debby Molina LPN  February 24, 2021

## 2021-02-25 DIAGNOSIS — O35.BXX0 ANOMALY OF HEART OF FETUS AFFECTING PREGNANCY, ANTEPARTUM, SINGLE OR UNSPECIFIED FETUS: Primary | ICD-10-CM

## 2021-02-25 NOTE — PROGRESS NOTES
REFERRAL SOURCE: Pediatric Cardiology Team/MFM Team    CHIEF COMPLAINT/PURPOSE OF VISIT: Fetal Diagnosis of Ebstein's Malformation    HISTORY OF PRESENT ILLNESS:   Ms. 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 No hydrops or history of arrhythmias. Pregnancy has been otherwise uncomplicated.         ASSESSMENT/PLAN:  #1 Abnormal Fetal Echocardiogram  #2 Fetal Diagnosis of Ebstein's Malformation     I discussed with Ms. Barboza and her  the echocardiographic findings and the plan after delivery. We discussed the nature of Ebstein malformation and it is not just an abnormality of the tricuspid valve but can extend to the right and or left ventricle as well. I dicussed the spectrum of the anomaly and the basic principles, indications and timing of interventions. I explained the concept of single ventricle vs two ventricle repair and the possibility of conversion from single to two ventricle repair. I explained that there several factors that will determine when and what to do after delivery, some of which is related to respiratory status, oxygen saturation, and ability to be fed. She understands the plan and we will follow-up with our MFM and pediatric cardiology teams for now and we will finalize the plan in more details after delivery. All questions were answered.

## 2021-03-01 ENCOUNTER — OFFICE VISIT (OUTPATIENT)
Dept: MATERNAL FETAL MEDICINE | Facility: CLINIC | Age: 36
End: 2021-03-01
Attending: OBSTETRICS & GYNECOLOGY
Payer: COMMERCIAL

## 2021-03-01 ENCOUNTER — HOSPITAL ENCOUNTER (OUTPATIENT)
Dept: ULTRASOUND IMAGING | Facility: CLINIC | Age: 36
End: 2021-03-01
Attending: OBSTETRICS & GYNECOLOGY
Payer: COMMERCIAL

## 2021-03-01 VITALS
DIASTOLIC BLOOD PRESSURE: 71 MMHG | BODY MASS INDEX: 27.69 KG/M2 | WEIGHT: 161.3 LBS | RESPIRATION RATE: 18 BRPM | OXYGEN SATURATION: 99 % | HEART RATE: 100 BPM | SYSTOLIC BLOOD PRESSURE: 131 MMHG

## 2021-03-01 DIAGNOSIS — O35.BXX0 ANOMALY OF HEART OF FETUS AFFECTING PREGNANCY, ANTEPARTUM, SINGLE OR UNSPECIFIED FETUS: ICD-10-CM

## 2021-03-01 LAB
ERYTHROCYTE [DISTWIDTH] IN BLOOD BY AUTOMATED COUNT: 12.9 % (ref 10–15)
FERRITIN SERPL-MCNC: 11 NG/ML (ref 12–150)
HCT VFR BLD AUTO: 31.4 % (ref 35–47)
HGB BLD-MCNC: 10.8 G/DL (ref 11.7–15.7)
MCH RBC QN AUTO: 30.5 PG (ref 26.5–33)
MCHC RBC AUTO-ENTMCNC: 34.4 G/DL (ref 31.5–36.5)
MCV RBC AUTO: 89 FL (ref 78–100)
PLATELET # BLD AUTO: 239 10E9/L (ref 150–450)
RBC # BLD AUTO: 3.54 10E12/L (ref 3.8–5.2)
WBC # BLD AUTO: 13.8 10E9/L (ref 4–11)

## 2021-03-01 PROCEDURE — 85027 COMPLETE CBC AUTOMATED: CPT | Performed by: OBSTETRICS & GYNECOLOGY

## 2021-03-01 PROCEDURE — G0463 HOSPITAL OUTPT CLINIC VISIT: HCPCS

## 2021-03-01 PROCEDURE — 76819 FETAL BIOPHYS PROFIL W/O NST: CPT

## 2021-03-01 PROCEDURE — 82728 ASSAY OF FERRITIN: CPT | Performed by: OBSTETRICS & GYNECOLOGY

## 2021-03-01 PROCEDURE — 76816 OB US FOLLOW-UP PER FETUS: CPT

## 2021-03-01 PROCEDURE — 76819 FETAL BIOPHYS PROFIL W/O NST: CPT | Mod: 26 | Performed by: OBSTETRICS & GYNECOLOGY

## 2021-03-01 PROCEDURE — 99212 OFFICE O/P EST SF 10 MIN: CPT | Mod: 25 | Performed by: OBSTETRICS & GYNECOLOGY

## 2021-03-01 PROCEDURE — 36415 COLL VENOUS BLD VENIPUNCTURE: CPT | Performed by: OBSTETRICS & GYNECOLOGY

## 2021-03-01 PROCEDURE — 76816 OB US FOLLOW-UP PER FETUS: CPT | Mod: 26 | Performed by: OBSTETRICS & GYNECOLOGY

## 2021-03-01 RX ORDER — LEVOTHYROXINE SODIUM 50 UG/1
50 TABLET ORAL DAILY
COMMUNITY
Start: 2021-02-13

## 2021-03-01 RX ORDER — RIBOFLAVIN (VITAMIN B2) 100 MG
4 TABLET ORAL DAILY
COMMUNITY

## 2021-03-01 NOTE — NURSING NOTE
Shilpa seen in clinic today for BPP and GISELLE prenatal visit at 34w5d gestation due to pregnancy c/b fetal CHD (see report/notes). VSS. Pt reports + FM. Pt denies bldg/lof/change in discharge/contractions/headache/vision changes/chest pain/SOB/edema. States she did received iron infusions in previous pregnancy. Will check CBC and ferritin today. Pt received contact info and new pt folder last week. Dr. Henriquez met with pt and discussed POC. Plan to continue weekly OB visits with BPP/Hydrops checks 2x/week. Map to outpatient lab reviewed. Pt declined covid testing 24 days prior to IOL. Pt discharged stable and ambulatory.       Kirstin Kelly RN

## 2021-03-01 NOTE — PROGRESS NOTES
Maternal-Fetal Medicine New OB Visit    Shilpa Hopkins  : 1985  MRN: [id number]    HPI:  Shilpa Hopkins is a 35 year old  at 34w5d by IUI dating here for new OB visit.    She is here as a transfer of care given presence of complex congenital cardiac defect and need for delivery at [facility]. She overall has been doing well. Has noted some intermittent emesis about once per week, but is overall tolerable and is just taking Vitamin B6. Was previously taking Unisom, but has since discontinued this. She also has intermittent heartburn for which Tums works well. She endorses normal FM of baby girl. Denies VB, LOF, or cxns.    She is currently on Synthroid 50 mcg daily. She denies history of hypothyroidism prior to pregnancy and states that she was started on 25mcg one month prior to conception. She has never had any symptoms of hypothyroidism and her TSH was last checked in January and was 1.96 on 21. She also notes history of iron deficiency anemia during her previous pregnancy for which she received IV iron infusions.     Prenatal Care:  Primary OB care this pregnancy has been with Dr. Michaels [facility].    Obstetrics History:  OB History    Para Term  AB Living   3 1 1 0 1 1   SAB TAB Ectopic Multiple Live Births   1 0 0 0 1      # Outcome Date GA Lbr Eliceo/2nd Weight Sex Delivery Anes PTL Lv   3 Current            2 Term 18 39w3d 07:52 / 01:53 3.49 kg (7 lb 11.1 oz) F Vag-Spont EPI  MARVA      Name: KEANU HOPKINS      Apgar1: 8  Apgar5: 8   1 SAB  8w0d              Gynecologic History:  - Last Pap: 2018 per patient report and normal  - Denies any history of abnormal pap smears  - Denies prior cervical surgery or procedures  - Denies any history of STIs    Past Medical History:  Past Medical History:   Diagnosis Date     Anemia      Depressive disorder      Hx of previous reproductive problem      Mental disorder     Anxiety/Depression       Past  Surgical History:  Past Surgical History:   Procedure Laterality Date     DENTAL SURGERY      wisdom tooth extraction     DILATION AND CURETTAGE SUCTION N/A 11/12/2016    Procedure: DILATION AND CURETTAGE SUCTION;  Surgeon: Yariel Santana MD;  Location:  OR       Current Medications:  Prior to Admission medications    Medication Sig Last Dose Taking? Auth Provider   CALCIUM-VITAMIN D PO  Taking Yes Reported, Patient   levothyroxine (SYNTHROID/LEVOTHROID) 50 MCG tablet Take 50 mcg by mouth daily Taking Yes Reported, Patient   MAGNESIUM PO Take 1 tablet by mouth daily Taking Yes Reported, Patient   Prenatal Vit-Fe Fumarate-FA (PRENATAL MULTIVITAMIN PLUS IRON) 27-0.8 MG TABS per tablet Take 1 tablet by mouth daily Taking Yes Reported, Patient   riboflavin (VITAMIN  B-2) 100 MG TABS tablet Take 4 tablets by mouth daily Taking Yes Reported, Patient       Allergies:  Patient has no known allergies.    Social History:   Occupation: Works at [employer] in [employer] department  Status: Lives with  and daughter; feels safe at home.  Denies current use of alcohol, drugs or smoking. Former tobacco smoker but quit several years ago.    Family History:  Denies history of genetic disorders, preeclampsia, thromboembolic disease, bleeding disorders, developmental delay.    ROS:  10-point ROS negative except as in HPI     PHYSICAL EXAM:  /71 (BP Location: Left arm, Patient Position: Chair)   Pulse 100   Resp 18   Wt 73.2 kg (161 lb 4.8 oz)   LMP 06/24/2020   SpO2 99%   BMI 27.69 kg/m       Gen Appear: alert and oriented, NAD  Pulm: CTAB  CV: RRR  Abdomen: gravid, soft, non-tender to palpation  Extremities: No LE edema or calf tenderness    Other Imaging:   See report under imaging tab for details    Labs 9/2020  Blood type A, Antibody pos  Hgb 14.0  MCV 90  Plts 285  HBSAg NR  RPR NR  HIV NR  Rubella immune  Urine Cx no growth      RPR 12/21 NR  TSH 1/19/21 1.96    ASSESSMENT/PLAN:  Shilpa MORRIS  Jabari is a 35 year old  at 34w5d by IUI dating here for new OB visit/transfer of care.    Complex congenital cardiac defect  - Ebstein's anomaly with severely dilated RA, severe TR, no anterograde flow across mildly hypoplastic pulmonary valve  - s/p genetic amniocentesis consistent with 46, XX  - s/p pediatric cardiology prenatal echo  - Ductal dependent lesion  - s/p pediatric cardiothoracic surgery and NICU consults  -  echocardiogram immediately after delivery recommend per pediatric cardiology  - Continue twice weekly BPPs with hydrops checks  - Serial growth US, last  2054g (56%), AC 86%.      Hypothyroidism  - Continue Synthroid 50 mcg  - TSH last checked on 2021. Patient continues to be asymptomatic.   - Given no dose adjustments have been made, will not re-check TSH at this time    History if iron deficiency anemia  - Will obtain routine third trimester CBC to screen for anemia and will obtain ferritin as well    Routine  - Rh positive  - s/p flu shot, TDap  - GBS to be obtained at next visit  - Contraception: to be discussed at next visit  - Breastfeeding: to be discussed at next visit  - Delivery planning: Plan for IOL at 39-40 weeks, tentatively schedule for 3/31 at 39 weeks. Will plan to formerly schedule in the next 1-2 weeks.      Seen and discussed with Dr. Navarro.    Vanessa Henriquez MD  Maternal Fetal Medicine Fellow    [facility] Attending Attestation    I have seen and evaluated the patient myself with the fellow. I spent a total of 15 minutes on counseling, coordination of care, and review of records.  See note for details; I have made the necessary edits/additions.    Nika Navarro MD  Maternal Fetal Medicine

## 2021-03-02 ENCOUNTER — TELEPHONE (OUTPATIENT)
Dept: MATERNAL FETAL MEDICINE | Facility: CLINIC | Age: 36
End: 2021-03-02

## 2021-03-02 DIAGNOSIS — Z20.822 ENCOUNTER FOR LABORATORY TESTING FOR COVID-19 VIRUS: Primary | ICD-10-CM

## 2021-03-02 DIAGNOSIS — O35.BXX0 ANOMALY OF HEART OF FETUS AFFECTING PREGNANCY, ANTEPARTUM, SINGLE OR UNSPECIFIED FETUS: ICD-10-CM

## 2021-03-02 RX ORDER — HEPARIN SODIUM,PORCINE 10 UNIT/ML
5 VIAL (ML) INTRAVENOUS
Status: CANCELLED | OUTPATIENT
Start: 2021-03-08

## 2021-03-02 RX ORDER — HEPARIN SODIUM (PORCINE) LOCK FLUSH IV SOLN 100 UNIT/ML 100 UNIT/ML
5 SOLUTION INTRAVENOUS
Status: CANCELLED | OUTPATIENT
Start: 2021-03-08

## 2021-03-02 NOTE — TELEPHONE ENCOUNTER
3/2/2021    Called patient to review results of CBC and ferritin that are consistent with iron deficiency anemia. Will plan for IV Iron (Venofer) 300mg q 3-5 days for three infusions. Patient demonstrates understanding and will be called to help facilitate scheduling of the infusions. Patient demonstrates understanding and all questions were answered to her satisfaction.    Component      Latest Ref Rng & Units 3/1/2021   WBC      4.0 - 11.0 10e9/L 13.8 (H)   RBC Count      3.8 - 5.2 10e12/L 3.54 (L)   Hemoglobin      11.7 - 15.7 g/dL 10.8 (L)   Hematocrit      35.0 - 47.0 % 31.4 (L)   MCV      78 - 100 fl 89   MCH      26.5 - 33.0 pg 30.5   MCHC      31.5 - 36.5 g/dL 34.4   RDW      10.0 - 15.0 % 12.9   Platelet Count      150 - 450 10e9/L 239     Component      Latest Ref Rng & Units 3/1/2021   Ferritin      12 - 150 ng/mL 11 (L)       Vanessa Henriquez MD  Maternal-Fetal Medicine Fellow

## 2021-03-02 NOTE — TELEPHONE ENCOUNTER
Phone call to Shilpa to review plan for iron infusions at [facility] and covid testing prior to infusion start date. Pt scheduled for covid test on 3/3 at [facility] at 1130. Phone number for [facility] given to Shilpa to call and schedule 3 infusions 3-5 days apart. Pt verbalized understanding.      Kirstin Kelly RN

## 2021-03-03 DIAGNOSIS — Z20.822 ENCOUNTER FOR LABORATORY TESTING FOR COVID-19 VIRUS: ICD-10-CM

## 2021-03-03 LAB
LABORATORY COMMENT REPORT: NORMAL
SARS-COV-2 RNA RESP QL NAA+PROBE: NEGATIVE
SARS-COV-2 RNA RESP QL NAA+PROBE: NORMAL
SPECIMEN SOURCE: NORMAL
SPECIMEN SOURCE: NORMAL

## 2021-03-03 PROCEDURE — U0003 INFECTIOUS AGENT DETECTION BY NUCLEIC ACID (DNA OR RNA); SEVERE ACUTE RESPIRATORY SYNDROME CORONAVIRUS 2 (SARS-COV-2) (CORONAVIRUS DISEASE [COVID-19]), AMPLIFIED PROBE TECHNIQUE, MAKING USE OF HIGH THROUGHPUT TECHNOLOGIES AS DESCRIBED BY CMS-2020-01-R: HCPCS | Performed by: STUDENT IN AN ORGANIZED HEALTH CARE EDUCATION/TRAINING PROGRAM

## 2021-03-03 PROCEDURE — U0005 INFEC AGEN DETEC AMPLI PROBE: HCPCS | Performed by: STUDENT IN AN ORGANIZED HEALTH CARE EDUCATION/TRAINING PROGRAM

## 2021-03-04 ENCOUNTER — HOSPITAL ENCOUNTER (OUTPATIENT)
Dept: ULTRASOUND IMAGING | Facility: CLINIC | Age: 36
End: 2021-03-04
Attending: OBSTETRICS & GYNECOLOGY
Payer: COMMERCIAL

## 2021-03-04 ENCOUNTER — OFFICE VISIT (OUTPATIENT)
Dept: MATERNAL FETAL MEDICINE | Facility: CLINIC | Age: 36
End: 2021-03-04
Attending: OBSTETRICS & GYNECOLOGY
Payer: COMMERCIAL

## 2021-03-04 DIAGNOSIS — O35.BXX0 ANOMALY OF HEART OF FETUS AFFECTING PREGNANCY, ANTEPARTUM, SINGLE OR UNSPECIFIED FETUS: Primary | ICD-10-CM

## 2021-03-04 DIAGNOSIS — O35.BXX0 ANOMALY OF HEART OF FETUS AFFECTING PREGNANCY, ANTEPARTUM, SINGLE OR UNSPECIFIED FETUS: ICD-10-CM

## 2021-03-04 PROCEDURE — 76819 FETAL BIOPHYS PROFIL W/O NST: CPT

## 2021-03-04 PROCEDURE — 76816 OB US FOLLOW-UP PER FETUS: CPT | Mod: 26 | Performed by: OBSTETRICS & GYNECOLOGY

## 2021-03-04 PROCEDURE — 76816 OB US FOLLOW-UP PER FETUS: CPT

## 2021-03-04 NOTE — PROGRESS NOTES
Shilpa Barboza was seen for an ultrasound today at the Maternal-Fetal Medicine center.      For the details of the ultrasound please see the report which can be found under the imaging tab.      Kirstin Bain MD  , OB/GYN  Maternal-Fetal Medicine  [email or web address]  [phone] (Main M Office)  [phone] or [phone] (for 24 hour MFM questions)  [phone] (Pager)    
importance of adherence to chosen treatment
client/family/caregiver education

## 2021-03-08 ENCOUNTER — OFFICE VISIT (OUTPATIENT)
Dept: MATERNAL FETAL MEDICINE | Facility: CLINIC | Age: 36
End: 2021-03-08
Attending: OBSTETRICS & GYNECOLOGY
Payer: COMMERCIAL

## 2021-03-08 ENCOUNTER — HOSPITAL ENCOUNTER (INPATIENT)
Facility: CLINIC | Age: 36
Setting detail: SURGERY ADMIT
End: 2021-03-08
Attending: OBSTETRICS & GYNECOLOGY | Admitting: OBSTETRICS & GYNECOLOGY
Payer: COMMERCIAL

## 2021-03-08 ENCOUNTER — INFUSION THERAPY VISIT (OUTPATIENT)
Dept: INFUSION THERAPY | Facility: CLINIC | Age: 36
End: 2021-03-08
Attending: INTERNAL MEDICINE
Payer: COMMERCIAL

## 2021-03-08 ENCOUNTER — HOSPITAL ENCOUNTER (OUTPATIENT)
Dept: ULTRASOUND IMAGING | Facility: CLINIC | Age: 36
End: 2021-03-08
Attending: OBSTETRICS & GYNECOLOGY
Payer: COMMERCIAL

## 2021-03-08 VITALS
TEMPERATURE: 98.3 F | SYSTOLIC BLOOD PRESSURE: 109 MMHG | WEIGHT: 163.5 LBS | DIASTOLIC BLOOD PRESSURE: 73 MMHG | HEART RATE: 87 BPM | RESPIRATION RATE: 16 BRPM | OXYGEN SATURATION: 97 % | BODY MASS INDEX: 28.06 KG/M2

## 2021-03-08 VITALS
WEIGHT: 162.3 LBS | OXYGEN SATURATION: 98 % | BODY MASS INDEX: 27.86 KG/M2 | RESPIRATION RATE: 18 BRPM | SYSTOLIC BLOOD PRESSURE: 101 MMHG | DIASTOLIC BLOOD PRESSURE: 64 MMHG | HEART RATE: 84 BPM

## 2021-03-08 DIAGNOSIS — O35.BXX0 ANOMALY OF HEART OF FETUS AFFECTING PREGNANCY, ANTEPARTUM, SINGLE OR UNSPECIFIED FETUS: ICD-10-CM

## 2021-03-08 DIAGNOSIS — O99.013 ANEMIA AFFECTING PREGNANCY IN THIRD TRIMESTER: Primary | ICD-10-CM

## 2021-03-08 DIAGNOSIS — Z20.822 ENCOUNTER FOR LABORATORY TESTING FOR COVID-19 VIRUS: Primary | ICD-10-CM

## 2021-03-08 PROCEDURE — 76816 OB US FOLLOW-UP PER FETUS: CPT

## 2021-03-08 PROCEDURE — 87653 STREP B DNA AMP PROBE: CPT | Performed by: STUDENT IN AN ORGANIZED HEALTH CARE EDUCATION/TRAINING PROGRAM

## 2021-03-08 PROCEDURE — 258N000003 HC RX IP 258 OP 636: Performed by: STUDENT IN AN ORGANIZED HEALTH CARE EDUCATION/TRAINING PROGRAM

## 2021-03-08 PROCEDURE — 99212 OFFICE O/P EST SF 10 MIN: CPT | Mod: 25 | Performed by: OBSTETRICS & GYNECOLOGY

## 2021-03-08 PROCEDURE — 76819 FETAL BIOPHYS PROFIL W/O NST: CPT

## 2021-03-08 PROCEDURE — G0463 HOSPITAL OUTPT CLINIC VISIT: HCPCS | Mod: 25

## 2021-03-08 PROCEDURE — 250N000011 HC RX IP 250 OP 636: Performed by: STUDENT IN AN ORGANIZED HEALTH CARE EDUCATION/TRAINING PROGRAM

## 2021-03-08 PROCEDURE — 36415 COLL VENOUS BLD VENIPUNCTURE: CPT

## 2021-03-08 PROCEDURE — 96365 THER/PROPH/DIAG IV INF INIT: CPT

## 2021-03-08 PROCEDURE — 76816 OB US FOLLOW-UP PER FETUS: CPT | Mod: 26 | Performed by: OBSTETRICS & GYNECOLOGY

## 2021-03-08 PROCEDURE — 76819 FETAL BIOPHYS PROFIL W/O NST: CPT | Mod: 26 | Performed by: OBSTETRICS & GYNECOLOGY

## 2021-03-08 RX ORDER — HEPARIN SODIUM (PORCINE) LOCK FLUSH IV SOLN 100 UNIT/ML 100 UNIT/ML
5 SOLUTION INTRAVENOUS
Status: CANCELLED | OUTPATIENT
Start: 2021-03-11

## 2021-03-08 RX ORDER — HEPARIN SODIUM,PORCINE 10 UNIT/ML
5 VIAL (ML) INTRAVENOUS
Status: CANCELLED | OUTPATIENT
Start: 2021-03-11

## 2021-03-08 RX ADMIN — IRON SUCROSE 300 MG: 20 INJECTION, SOLUTION INTRAVENOUS at 14:07

## 2021-03-08 RX ADMIN — SODIUM CHLORIDE 250 ML: 9 INJECTION, SOLUTION INTRAVENOUS at 14:05

## 2021-03-08 ASSESSMENT — PAIN SCALES - GENERAL: PAINLEVEL: NO PAIN (0)

## 2021-03-08 NOTE — PROGRESS NOTES
Infusion Nursing Note:  Shilpa MORRIS Jabari presents today for Venofer # 1 of 3.    Patient seen by provider today: No   present during visit today: Not Applicable.    Note:  Patient reports is feeling well today.  Patient denies fevers, chills or signs of infection.    Writer reviewed with patient signs of possible reaction to Venofer.  Patient understands to contact staff immediately if experiencing any issues.  Writer reviewed possible side effects with Venofer.  Patient understands to contact office of ordering provider if experiences any issues after Venofer.    Intravenous Access:  Peripheral IV placed.  PIV site C/D/I.  PIV flushes well + excellent blood return.    Treatment Conditions: N/A    Post Infusion Assessment:  Patient tolerated infusion without incident.  Blood return noted pre and post infusion.  Site patent and intact, free from redness, edema or discomfort.  No evidence of extravasations.  Access discontinued per protocol.       Discharge Plan:   Discharge instructions reviewed with: Patient.  Patient and/or family verbalized understanding of discharge instructions and all questions answered.  Patient discharged in stable condition accompanied by: self.  Departure Mode: Ambulatory.  3/10/21: Venofer # 2 of 3.  3/12/21: Venofer # 3 of 3.    Ny Freeman, RN, BSN, OCN on 3/8/2021 at 3:57 PM

## 2021-03-08 NOTE — NURSING NOTE
Shilpa seen in clinic today for follow up ultrasound for hydrops check and BPP and OB visit at 35w5d gestation due to pregnancy c/b fetal CHD (see report/notes). VSS. Pt reports + fetal movement. Pt denies bldg/lof/change in discharge/contractions/headache/vision changes/chest pain/SOB/edema. GBS collected today. Pt receiving 1st iron infusion this afternoon at [facility]. Dr. Henriquez and Dr. Joseph met with pt and discussed POC. Plan to continue 2x/week BPP and hydrops checks. Follow up growth 3/12, and weekly OB visits.  IOL scheduled for 3/31 0900. L&D and NICU aware. Pt discharged stable and ambulatory.     Kirstin Kelly RN

## 2021-03-08 NOTE — PROGRESS NOTES
Maternal Fetal Medicine OB Follow-up Visit    S:  Patient doing well and has no complaints at this time. Endorses normal FM. Denies VB, LOF, or contractions. Scheduled for first IV Iron infusion today. Desires scheduling IOL. Has questions about intercourse during pregnancy and safety.    O:   /64 (BP Location: Left arm, Patient Position: Chair)   Pulse 84   Resp 18   Wt 73.6 kg (162 lb 4.8 oz)   LMP 2020   SpO2 98%   BMI 27.86 kg/m       Gen Appear: NAD  Cardiopulm: non-labored breathing  Abdomen: gravid, soft, nontender to palpation  SVE: FT/long/high, GBS swab collected  Extrem: No LE edema    MFM US  See report under imaging tab for details    A/P: 35 year old  at 35w5d by IUI dating here for CLINT.    Complex congenital cardiac defect  - Ebstein's anomaly with severely dilated RA, severe TR, no anterograde flow across mildly hypoplastic pulmonary valve  - Ductal dependent lesion  - s/p genetic amniocentesis consistent with 46, XX  - s/p pediatric cardiology prenatal echo  - s/p pediatric cardiothoracic surgery and NICU consults  -  echocardiogram immediately after delivery recommend per pediatric cardiology  - Continue twice weekly BPPs with hydrops checks  - Serial growth US, last  2054g (56%), AC 86%. Next on 3/12.     Hypothyroidism  - Continue Synthroid 50 mcg  - TSH last checked on 2021. Patient continues to be asymptomatic.   - Given no dose adjustments have been made, will not re-check TSH at this time     Iron deficiency anemia  - Hgb 10.8, ferritin 11  - Receiving Venofer infusions, M/W/ week of 3/8    Routine  - Rh positive  - s/p flu shot, TDap   - Discussed no contraindications to intercourse during pregnancy, if desired by patient.   - GBS collected today.  - Contraception:Discussed various birth control options including LARCs, Depot, birth control pills. Patient has used birth control pills in the past, but still undecided regarding options and will  consider.  - Breastfeeding: Desires breastfeeding. Previously  first baby for 1 year.   - Delivery planning: IOL scheduled on 3/31/2021 at 39w0d at 9AM.    RTC in 1 week.    Seen and discussed with Dr. Joseph.    Vanessa Henriquez MD  Maternal-Fetal Medicine Fellow    Physician Attestation   I, Kirstin Joseph MD, saw this patient and agree with the findings and plan of care as documented in the note.      Items personally reviewed/procedural attestation: reviewed that patient continuing to feel well. Planning for IV iron infusions.    Will plan to continue 2X weekly BPP/hydrops check for known fetal ebstein's anomaly.   IOL scheduled       Kirstin Joseph MD PhD  Department of Obstetrics, Gynecology and Women's Health  Maternal Fetal Medicine Division

## 2021-03-09 ENCOUNTER — OFFICE VISIT (OUTPATIENT)
Dept: MATERNAL FETAL MEDICINE | Facility: CLINIC | Age: 36
End: 2021-03-09
Attending: ADVANCED PRACTICE MIDWIFE
Payer: COMMERCIAL

## 2021-03-09 ENCOUNTER — TELEPHONE (OUTPATIENT)
Dept: MATERNAL FETAL MEDICINE | Facility: CLINIC | Age: 36
End: 2021-03-09

## 2021-03-09 VITALS
RESPIRATION RATE: 18 BRPM | OXYGEN SATURATION: 97 % | DIASTOLIC BLOOD PRESSURE: 68 MMHG | HEART RATE: 91 BPM | SYSTOLIC BLOOD PRESSURE: 112 MMHG

## 2021-03-09 DIAGNOSIS — O26.893 ABDOMINAL PAIN IN PREGNANCY, THIRD TRIMESTER: ICD-10-CM

## 2021-03-09 DIAGNOSIS — O26.893 ABDOMINAL PAIN IN PREGNANCY, THIRD TRIMESTER: Primary | ICD-10-CM

## 2021-03-09 DIAGNOSIS — R10.9 ABDOMINAL PAIN IN PREGNANCY, THIRD TRIMESTER: Primary | ICD-10-CM

## 2021-03-09 DIAGNOSIS — R10.9 ABDOMINAL PAIN IN PREGNANCY, THIRD TRIMESTER: ICD-10-CM

## 2021-03-09 DIAGNOSIS — O35.BXX0 ANOMALY OF HEART OF FETUS AFFECTING PREGNANCY, ANTEPARTUM, SINGLE OR UNSPECIFIED FETUS: Primary | ICD-10-CM

## 2021-03-09 DIAGNOSIS — O35.BXX0 ANOMALY OF HEART OF FETUS AFFECTING PREGNANCY, ANTEPARTUM, SINGLE OR UNSPECIFIED FETUS: ICD-10-CM

## 2021-03-09 LAB
GP B STREP DNA SPEC QL NAA+PROBE: NEGATIVE
SPECIMEN SOURCE: NORMAL

## 2021-03-09 PROCEDURE — 99212 OFFICE O/P EST SF 10 MIN: CPT | Mod: GC | Performed by: OBSTETRICS & GYNECOLOGY

## 2021-03-09 PROCEDURE — 59025 FETAL NON-STRESS TEST: CPT

## 2021-03-09 PROCEDURE — G0463 HOSPITAL OUTPT CLINIC VISIT: HCPCS | Mod: 25

## 2021-03-09 ASSESSMENT — PAIN SCALES - GENERAL: PAINLEVEL: MILD PAIN (2)

## 2021-03-09 NOTE — PROGRESS NOTES
Maternal Fetal Medicine Acute OB Visit    S: Patient presenting because she had her first Venofer infusion yesterday, which finished around 1545. At around 1700, she began noting some lower abdominal cramping. She described pain as aching/throbbing, and then progressively worsening throughout the evening. It was at its worst around 2100, at which time she rated the pain 7/10. She called labor and delivery and was given precautions and patient ultimately stayed at home. She was able to go to sleep and stay asleep. She only noted pain when she woke up to walk to the restroom, otherwise the pain did not wake her up. She notes regular bowel movements and had a normal BM this AM, which did not affect the pain. She denies any urinary symptoms. She denies any vaginal bleeding, LOF, or contractions. Denies any falls or abdominal trauma. Endorses normal FM.     O:   HR 91 /68 SpO2 97% on room air    Gen Appear: NAD, well-appearing  Cardiopulm: non-labored breathing  Abdomen: gravid, soft, nontender to palpation in all quadrants, upon deep palpation in the lower abdomen, noted some tightness, though this did not reproduce the pain, no rebound or guarding, pain reproduced when straight leg raises performed bilaterally  Extrem: No LE edema    /mod emily/+accels/no decels  [place] none    A/P: 35 year old  at 35w6d by IUI dating here for CLINT. Pregnancy complicated by fetal complex cardiac defect and iron deficiency anemia. Patient presenting to clinic today as she received her first IV iron infusion yesterday, 3/8, and several hours later began to experience lower abdominal pain/cramping.     - No acute distress, vitals within normal limits  - Abdominal exam reassuring without any evidence of placental abruption  - NST reassuring and reactive without decelerations or evidence of contractions  - Pain likely related to IV Iron infusion, which can be a side effect of Venofer. This was discussed with the patient. No  obstetric etiology identified on today's examination. Discussed side effect with pharmacist in inpatient pharmacy and they stated that this was not a contraindication to completing the two other Venofer infusions. Discussed that if patient does not desire to complete the other infusions given the side effect, it is reasonable to forgo, especially given that her Hgb was 10.8. Patient would like to consider. Kirstin Kelly, RN will follow-up with patient tomorrow to see if she desires to continue with infusions.   - Strict precautions including worsening pain, vaginal bleeding, LOF, cxns, and/or decreased FM were reviewed with the patient. If these or any worrisome signs should occur, patient to present to L&D for evaluation.    Discussed with Dr. Trujillo.    Vanessa Henriquez MD  Maternal-Fetal Medicine Fellow    FACULTY  NOTE:    The patient was seen for an established outpatient visit.  I spent a total of 10 minutes face to face with Shilpa Barboza during today's visit. Over 50% of this time was spent counseling the patient and/or coordinating care high risk pregnancy.

## 2021-03-09 NOTE — NURSING NOTE
Shilpa seen in clinic today for evaluation of low abdominal pain at 35w6d gestation (see report/notes). Pregnancy is c/b fetal CHD. Pt started iron infusions on 3/8. Fe infusion finished around 345pm on 3/8. Pt developed lower abd pain around 530pm that progressively got worse through the evening. Pt was able to sleep, woke up this morning and as discomfort mostly while moving, changing positions, lifting legs. Pt reports + FM. Denies pain or tenderness upon palpation. VSS. Pt denies bldg/lof/change in discharge/contractions/headache/vision changes/chest pain/SOB/edema. NST performed. Dr. Henriquez met with pt, reviewed NST and POC. Pt has scheduled appt with MFM on Friday 3/12. Pt is scheduled for another iron infusion tomorrow afternoon at 2pm. RN coordinator will follow up with patient in the morning.  Pt discharged stable and ambulatory.     Kirstin Kelly RN

## 2021-03-09 NOTE — TELEPHONE ENCOUNTER
Patient calling to [facility] this morning with c/o abdominal pain last evening. Patient states she finished her iron infusion around 3:45pm. Developed constant lower abdominal pain around 5:30pm. Pt denies cramping, states it didn't feel like contractions, no N/V, denies gas or intestinal pain. Denies vaginal bleeding or spotting. Reports + fetal movement. Pt states pain was at its worse around 930pm when she called to [facility]. She thought about going to the hospital but was able to fall asleep and the pain improved. Now pt reports lower abdominal pain with movement from laying to sitting, sitting to standing. Dr. Henriquez met with patient yesterday on 3/8 and was notified of current situation. Will offer patient an appt in clinic today. Pt agreed to come to [facility] clinic at 2pm today for evaluation.      Kirstin Kelly RN

## 2021-03-10 ENCOUNTER — TELEPHONE (OUTPATIENT)
Dept: MATERNAL FETAL MEDICINE | Facility: CLINIC | Age: 36
End: 2021-03-10

## 2021-03-10 DIAGNOSIS — O35.BXX0 ANOMALY OF HEART OF FETUS AFFECTING PREGNANCY, ANTEPARTUM, SINGLE OR UNSPECIFIED FETUS: ICD-10-CM

## 2021-03-10 NOTE — TELEPHONE ENCOUNTER
Phone call to Shilpa following up on lower abdominal pain patient was experiencing since Monday evening 3/8. Pt states she is doing much better. Pain in mostly gone. Discussed continuing iron infusions or not. After talking with Shilpa, she would like to discontinue iron infusions. Infusion clinic notified. Discussed adding oral iron, vit C, vit B12 to help with absorption. Pt is scheduled on for follow up with MFM on Friday 3/12.      Kirstin Kelly RN

## 2021-03-11 NOTE — PROGRESS NOTES
NST reactive.     Ansley Trujillo DO FACOG  Maternal Fetal Medicine Specialist  Pager: [phone]  Mobile: [phone]

## 2021-03-12 ENCOUNTER — HOSPITAL ENCOUNTER (OUTPATIENT)
Dept: ULTRASOUND IMAGING | Facility: CLINIC | Age: 36
End: 2021-03-12
Attending: OBSTETRICS & GYNECOLOGY
Payer: COMMERCIAL

## 2021-03-12 ENCOUNTER — OFFICE VISIT (OUTPATIENT)
Dept: MATERNAL FETAL MEDICINE | Facility: CLINIC | Age: 36
End: 2021-03-12
Attending: OBSTETRICS & GYNECOLOGY
Payer: COMMERCIAL

## 2021-03-12 DIAGNOSIS — O35.BXX0 ANOMALY OF HEART OF FETUS AFFECTING PREGNANCY, ANTEPARTUM, SINGLE OR UNSPECIFIED FETUS: Primary | ICD-10-CM

## 2021-03-12 DIAGNOSIS — O35.BXX0 ANOMALY OF HEART OF FETUS AFFECTING PREGNANCY, ANTEPARTUM, SINGLE OR UNSPECIFIED FETUS: ICD-10-CM

## 2021-03-12 PROCEDURE — 76816 OB US FOLLOW-UP PER FETUS: CPT | Mod: 26 | Performed by: OBSTETRICS & GYNECOLOGY

## 2021-03-12 PROCEDURE — 76816 OB US FOLLOW-UP PER FETUS: CPT

## 2021-03-12 PROCEDURE — 76819 FETAL BIOPHYS PROFIL W/O NST: CPT | Mod: 26 | Performed by: OBSTETRICS & GYNECOLOGY

## 2021-03-12 NOTE — PROGRESS NOTES
Please see the imaging tab for details of the ultrasound performed today.    Marika Colon MD  Specialist in Maternal-Fetal Medicine

## 2021-03-15 ENCOUNTER — HOSPITAL ENCOUNTER (OUTPATIENT)
Dept: ULTRASOUND IMAGING | Facility: CLINIC | Age: 36
End: 2021-03-15
Attending: OBSTETRICS & GYNECOLOGY
Payer: COMMERCIAL

## 2021-03-15 ENCOUNTER — OFFICE VISIT (OUTPATIENT)
Dept: MATERNAL FETAL MEDICINE | Facility: CLINIC | Age: 36
End: 2021-03-15
Attending: OBSTETRICS & GYNECOLOGY
Payer: COMMERCIAL

## 2021-03-15 VITALS
RESPIRATION RATE: 18 BRPM | BODY MASS INDEX: 28.15 KG/M2 | OXYGEN SATURATION: 97 % | HEART RATE: 93 BPM | DIASTOLIC BLOOD PRESSURE: 69 MMHG | SYSTOLIC BLOOD PRESSURE: 115 MMHG | WEIGHT: 164 LBS

## 2021-03-15 DIAGNOSIS — Z20.822 ENCOUNTER FOR LABORATORY TESTING FOR COVID-19 VIRUS: ICD-10-CM

## 2021-03-15 DIAGNOSIS — O35.BXX0 ANOMALY OF HEART OF FETUS AFFECTING PREGNANCY, ANTEPARTUM, SINGLE OR UNSPECIFIED FETUS: Primary | ICD-10-CM

## 2021-03-15 DIAGNOSIS — O35.BXX0 ANOMALY OF HEART OF FETUS AFFECTING PREGNANCY, ANTEPARTUM, SINGLE OR UNSPECIFIED FETUS: ICD-10-CM

## 2021-03-15 PROCEDURE — 76816 OB US FOLLOW-UP PER FETUS: CPT | Mod: 26 | Performed by: OBSTETRICS & GYNECOLOGY

## 2021-03-15 PROCEDURE — 76819 FETAL BIOPHYS PROFIL W/O NST: CPT | Mod: 26 | Performed by: OBSTETRICS & GYNECOLOGY

## 2021-03-15 PROCEDURE — 99212 OFFICE O/P EST SF 10 MIN: CPT | Mod: 25 | Performed by: OBSTETRICS & GYNECOLOGY

## 2021-03-15 PROCEDURE — G0463 HOSPITAL OUTPT CLINIC VISIT: HCPCS | Mod: 25

## 2021-03-15 PROCEDURE — 76816 OB US FOLLOW-UP PER FETUS: CPT

## 2021-03-15 PROCEDURE — 76819 FETAL BIOPHYS PROFIL W/O NST: CPT

## 2021-03-15 RX ORDER — ASCORBIC ACID 250 MG
250 TABLET,CHEWABLE ORAL DAILY
COMMUNITY

## 2021-03-15 RX ORDER — PNV NO.95/FERROUS FUM/FOLIC AC 28MG-0.8MG
1 TABLET ORAL DAILY
COMMUNITY

## 2021-03-15 NOTE — NURSING NOTE
Shilpa seen in clinic today for hydrops check, BPP, ob visit at 36w5d gestation due to pregnancy c/b fetal CHD (see report/notes). Pt reports + FM. VSS. Pt denies bldg/lof/change in discharge/contractions/headache/vision changes/chest pain/SOB/edema. GBS neg. Pt scheduled for IOL 3/31 0900. Dr. Henriquez met with pt and discussed POC. Plan to continue 2x/week hydrops checks and BPP. Pt discharged stable and ambulatory.       Kirstin Kelly RN

## 2021-03-15 NOTE — PROGRESS NOTES
Maternal Fetal Medicine OB Follow-Up Visit    S:  Patient overall doing well and has no complaints or concerns. Endorses normal FM. Denies VB, LOF, or cxns.     O:   /69 (BP Location: Left arm, Patient Position: Chair)   Pulse 93   Resp 18   Wt 74.4 kg (164 lb)   LMP 2020   SpO2 97%   BMI 28.15 kg/m       Gen Appear: NAD  Cardiopulm: non-labored breathing  Abdomen: gravid, soft, nontender to palpation  Extrem: No LE edema    MFM US  See report under imaging tab for details    A/P: 35 year old  at 36w5d by IUI dating here for CLINT.    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 Patient continues to be asymptomatic.   - Given no dose adjustments have been made, will not re-check TSH at this time     Iron deficiency anemia  - Hgb 10.8, ferritin 11  - Received one infusion of Venofer, but developed abdominal pain as a result and does not desire to complete, which is reasonable.  - Continue PO iron. Discussed use of stool softeners, miralax, if needed.    Routine  - Rh positive  - s/p flu shot, TDap   - Discussed no contraindications to intercourse during pregnancy, if desired by patient.   - GBS negative. This was discussed with the patient today.  - Contraception:Discussed various birth control options including LARCs, Depot, birth control pills. Patient has used birth control pills in the past, but still undecided regarding options and will  consider.  - Breastfeeding: Desires breastfeeding. Previously  first baby for 1 year.   - Delivery planning: IOL scheduled on 3/31/2021 at 39w0d at 9AM.    RTC in 1 week.    Discussed with Dr. Bain.    Vanessa Henriquez MD  Maternal-Fetal Medicine Fellow    MFM Attending Attestation  I have seen and evaluated the patient myself. I reviewed her imaging/labs and chart since the last MFM visit. See note for details; I have made the necessary edits/additions.    Kirstin Bain MD  , OB/GYN  Maternal-Fetal Medicine  [email or web address]  [phone] (Main MFM Office)  [phone] or [phone] (for 24 hour MFM questions)  [phone] (Pager)      Time Spent on this Encounter   I spent 15 minutes managing the care of Shilpa Barboza, including:   - Counseling the patient and/or family regarding: diagnosis, diagnostic results, prognosis and risks and benefits of treatment options   - Coordination of care with the: nurse and patient and the sonographer and the fellow     Date of service (when I saw the patient): March 15, 2021

## 2021-03-19 ENCOUNTER — OFFICE VISIT (OUTPATIENT)
Dept: MATERNAL FETAL MEDICINE | Facility: CLINIC | Age: 36
End: 2021-03-19
Attending: OBSTETRICS & GYNECOLOGY
Payer: COMMERCIAL

## 2021-03-19 ENCOUNTER — HOSPITAL ENCOUNTER (OUTPATIENT)
Dept: ULTRASOUND IMAGING | Facility: CLINIC | Age: 36
End: 2021-03-19
Attending: OBSTETRICS & GYNECOLOGY
Payer: COMMERCIAL

## 2021-03-19 DIAGNOSIS — O35.BXX0 ANOMALY OF HEART OF FETUS AFFECTING PREGNANCY, ANTEPARTUM, SINGLE OR UNSPECIFIED FETUS: ICD-10-CM

## 2021-03-19 DIAGNOSIS — O35.BXX0 ANOMALY OF HEART OF FETUS AFFECTING PREGNANCY, ANTEPARTUM, SINGLE OR UNSPECIFIED FETUS: Primary | ICD-10-CM

## 2021-03-19 PROCEDURE — 76819 FETAL BIOPHYS PROFIL W/O NST: CPT

## 2021-03-19 PROCEDURE — 76816 OB US FOLLOW-UP PER FETUS: CPT | Mod: 26 | Performed by: OBSTETRICS & GYNECOLOGY

## 2021-03-19 PROCEDURE — 76819 FETAL BIOPHYS PROFIL W/O NST: CPT | Mod: 26 | Performed by: OBSTETRICS & GYNECOLOGY

## 2021-03-19 PROCEDURE — 76816 OB US FOLLOW-UP PER FETUS: CPT

## 2021-03-19 NOTE — PROGRESS NOTES
The patient was seen for an ultrasound in the Maternal-Fetal Medicine Center at the [facility] today.  For a detailed report of the ultrasound examination, please see the ultrasound report which can be found under the imaging tab.    Shilpa Finley MD  , OB/GYN  Maternal-Fetal Medicine  [phone] (Pager)

## 2021-03-22 ENCOUNTER — OFFICE VISIT (OUTPATIENT)
Dept: MATERNAL FETAL MEDICINE | Facility: CLINIC | Age: 36
End: 2021-03-22
Attending: OBSTETRICS & GYNECOLOGY
Payer: COMMERCIAL

## 2021-03-22 ENCOUNTER — HOSPITAL ENCOUNTER (OUTPATIENT)
Dept: ULTRASOUND IMAGING | Facility: CLINIC | Age: 36
End: 2021-03-22
Attending: OBSTETRICS & GYNECOLOGY
Payer: COMMERCIAL

## 2021-03-22 VITALS
HEART RATE: 99 BPM | OXYGEN SATURATION: 97 % | BODY MASS INDEX: 28.2 KG/M2 | SYSTOLIC BLOOD PRESSURE: 117 MMHG | DIASTOLIC BLOOD PRESSURE: 70 MMHG | RESPIRATION RATE: 18 BRPM | WEIGHT: 164.3 LBS

## 2021-03-22 DIAGNOSIS — O35.BXX0 ANOMALY OF HEART OF FETUS AFFECTING PREGNANCY, ANTEPARTUM, SINGLE OR UNSPECIFIED FETUS: ICD-10-CM

## 2021-03-22 DIAGNOSIS — O35.BXX0 ANOMALY OF HEART OF FETUS AFFECTING PREGNANCY, ANTEPARTUM, SINGLE OR UNSPECIFIED FETUS: Primary | ICD-10-CM

## 2021-03-22 PROCEDURE — 76819 FETAL BIOPHYS PROFIL W/O NST: CPT

## 2021-03-22 PROCEDURE — G0463 HOSPITAL OUTPT CLINIC VISIT: HCPCS | Mod: 25

## 2021-03-22 PROCEDURE — 76819 FETAL BIOPHYS PROFIL W/O NST: CPT | Mod: 26 | Performed by: OBSTETRICS & GYNECOLOGY

## 2021-03-22 PROCEDURE — 76816 OB US FOLLOW-UP PER FETUS: CPT | Mod: 26 | Performed by: OBSTETRICS & GYNECOLOGY

## 2021-03-22 PROCEDURE — 76816 OB US FOLLOW-UP PER FETUS: CPT

## 2021-03-22 PROCEDURE — 99213 OFFICE O/P EST LOW 20 MIN: CPT | Mod: 25 | Performed by: OBSTETRICS & GYNECOLOGY

## 2021-03-22 ASSESSMENT — PATIENT HEALTH QUESTIONNAIRE - PHQ9: SUM OF ALL RESPONSES TO PHQ QUESTIONS 1-9: 3

## 2021-03-22 NOTE — PROGRESS NOTES
Maternal Fetal Medicine OB Follow-Up Visit    S:  Patient overall doing well and has no complaints or concerns. Endorses normal FM. Denies VB, LOF, or cxns.     O:   /70 (BP Location: Left arm, Patient Position: Chair)   Pulse 99   Resp 18   Wt 74.5 kg (164 lb 4.8 oz)   LMP 2020   SpO2 97%   BMI 28.20 kg/m       Gen Appear: NAD  Cardiopulm: non-labored breathing  Abdomen: gravid, soft, nontender to palpation  SVE: 1.5/50/-3, medium consistency, mid position    MFM US  See report under imaging tab for details    A/P: 35 year old  at 37w5d by IUI dating here for CLINT.    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 Patient continues to be asymptomatic.   - Given no dose adjustments have been made, will not re-check TSH at this time     Iron deficiency anemia  - Hgb 10.8, ferritin 11  - Received one infusion of Venofer, but developed abdominal pain as a result and does not desire to complete, which is reasonable.  - Continue PO iron. Discussed use of stool softeners, miralax, if needed.    Routine  - Rh positive  - s/p flu shot, TDap   - Discussed no contraindications to intercourse during pregnancy, if desired by patient.   - GBS negative. This was discussed with the patient today.  - Contraception:Discussed various birth control options including LARCs, Depot, birth control pills. Patient has used birth control pills in the past, but still  undecided regarding options and will consider.  - Breastfeeding: Desires breastfeeding. Previously  first baby for 1 year.   - Delivery planning:     Patient was previously scheduled for IOL at 39w0d on 3/31. However, after discussion with Elayne Dean and Lucas, pediatric cardiology, given fetal cardiac lesion, discussed with patient that scheduled  section may be recommended as there may be concern regarding fetal reserve and ability to tolerate labor. Also, discussed that there is concern of availability of  ECMO, if needed immediately after delivery.     Pediatric cardiology is planning to further discuss this patient's case as well as availability of CT surgery to decide whether  section should be recommended. Will discuss their recommendation with the patient later this week. Patient was understanding and would like to do what is safest for her baby, though does desire vaginal delivery if deemed to be a safe option. In anticipation that  section is recommended, we did discuss what to expect for  section, including anesthesia, procedure, hospital stay postpartum, etc. All questions were answered to her satisfaction at this time. Will touch base with the patient later this week to discuss mode of delivery.    RTC in 1 week    Seen and discussed with Dr. Finley.    Vanessa Henriquez MD  Maternal-Fetal Medicine Fellow

## 2021-03-22 NOTE — NURSING NOTE
Shilpa seen in clinic today for hydrops check with BPP and OB visit at 37w5d gestation due to pregnancy c/b fetal CHD (see report/notes). VSS. Pt reports + FM. Pt denies bldg/lof/change in discharge/contractions/headache/vision changes/chest pain/SOB/edema. Dr. Henriquez and Dr. Finley met with pt and discussed POC. Plan to continue weekly hydrops checks with BPP and OB visits. IOL scheduled for 3/31 at 0900. Depending on consensus from peds cardiology, may need to plan scheduled c/section. COVID test scheduled for 3/29.  Pt discharged stable and ambulatory.       Kirstin Kelly RN

## 2021-03-25 ENCOUNTER — TELEPHONE (OUTPATIENT)
Dept: MATERNAL FETAL MEDICINE | Facility: CLINIC | Age: 36
End: 2021-03-25

## 2021-03-25 NOTE — TELEPHONE ENCOUNTER
3/25/2021    Called patient to discuss recommendation for primary  section given presence of complex congenital cardiac disease in the baby. Discussed C/S tentatively scheduled for 3/31 at 0830, though date/time need to be confirmed to assess availability of pediatric CT surgery. Once date/time confirmed, will notify patient ASAP. Patient demonstrates understanding. Patient had questions regarding normal recovery after  section, which was discussed.     All questions answered to her satisfaction at this time.    Vanessa Henriquez MD  Maternal-Fetal Medicine Fellow

## 2021-03-26 ENCOUNTER — PREP FOR PROCEDURE (OUTPATIENT)
Dept: MATERNAL FETAL MEDICINE | Facility: CLINIC | Age: 36
End: 2021-03-26

## 2021-03-26 ENCOUNTER — TELEPHONE (OUTPATIENT)
Dept: MATERNAL FETAL MEDICINE | Facility: CLINIC | Age: 36
End: 2021-03-26

## 2021-03-26 ENCOUNTER — OFFICE VISIT (OUTPATIENT)
Dept: MATERNAL FETAL MEDICINE | Facility: CLINIC | Age: 36
End: 2021-03-26
Attending: OBSTETRICS & GYNECOLOGY
Payer: COMMERCIAL

## 2021-03-26 ENCOUNTER — HOSPITAL ENCOUNTER (OUTPATIENT)
Dept: ULTRASOUND IMAGING | Facility: CLINIC | Age: 36
End: 2021-03-26
Attending: OBSTETRICS & GYNECOLOGY
Payer: COMMERCIAL

## 2021-03-26 DIAGNOSIS — O35.BXX0 ANOMALY OF HEART OF FETUS AFFECTING PREGNANCY, ANTEPARTUM, SINGLE OR UNSPECIFIED FETUS: Primary | ICD-10-CM

## 2021-03-26 DIAGNOSIS — O35.BXX0 ANOMALY OF HEART OF FETUS AFFECTING PREGNANCY, ANTEPARTUM, SINGLE OR UNSPECIFIED FETUS: ICD-10-CM

## 2021-03-26 PROCEDURE — 76819 FETAL BIOPHYS PROFIL W/O NST: CPT | Mod: 26 | Performed by: OBSTETRICS & GYNECOLOGY

## 2021-03-26 PROCEDURE — 76816 OB US FOLLOW-UP PER FETUS: CPT | Mod: 26 | Performed by: OBSTETRICS & GYNECOLOGY

## 2021-03-26 PROCEDURE — 76819 FETAL BIOPHYS PROFIL W/O NST: CPT

## 2021-03-26 PROCEDURE — 76816 OB US FOLLOW-UP PER FETUS: CPT

## 2021-03-26 NOTE — TELEPHONE ENCOUNTER
3/26/2021    Called patient to inform her that primary  section scheduled for 3/31 at 0830. Patient demonstrates understanding and all questions answered at this time.    Vanessa Henriquez MD  Maternal-Fetal Medicine Fellow

## 2021-03-26 NOTE — PROGRESS NOTES
The patient was seen for an ultrasound in the Maternal-Fetal Medicine Center at the [facility] today.  For a detailed report of the ultrasound examination, please see the ultrasound report which can be found under the imaging tab.    Shilpa Finley MD  , OB/GYN  Maternal-Fetal Medicine  [phone] (Pager)

## 2021-03-29 ENCOUNTER — HOSPITAL ENCOUNTER (OUTPATIENT)
Dept: LAB | Facility: CLINIC | Age: 36
Discharge: HOME | End: 2021-03-29
Attending: OBSTETRICS & GYNECOLOGY | Admitting: STUDENT IN AN ORGANIZED HEALTH CARE EDUCATION/TRAINING PROGRAM
Payer: COMMERCIAL

## 2021-03-29 ENCOUNTER — OFFICE VISIT (OUTPATIENT)
Dept: MATERNAL FETAL MEDICINE | Facility: CLINIC | Age: 36
End: 2021-03-29
Attending: OBSTETRICS & GYNECOLOGY
Payer: COMMERCIAL

## 2021-03-29 ENCOUNTER — HOSPITAL ENCOUNTER (OUTPATIENT)
Dept: ULTRASOUND IMAGING | Facility: CLINIC | Age: 36
End: 2021-03-29
Attending: OBSTETRICS & GYNECOLOGY
Payer: COMMERCIAL

## 2021-03-29 ENCOUNTER — PREP FOR PROCEDURE (OUTPATIENT)
Dept: MATERNAL FETAL MEDICINE | Facility: CLINIC | Age: 36
End: 2021-03-29

## 2021-03-29 VITALS
WEIGHT: 166 LBS | HEART RATE: 90 BPM | DIASTOLIC BLOOD PRESSURE: 69 MMHG | BODY MASS INDEX: 28.49 KG/M2 | OXYGEN SATURATION: 97 % | SYSTOLIC BLOOD PRESSURE: 112 MMHG | RESPIRATION RATE: 18 BRPM

## 2021-03-29 DIAGNOSIS — O35.BXX0 ANOMALY OF HEART OF FETUS AFFECTING PREGNANCY, ANTEPARTUM, SINGLE OR UNSPECIFIED FETUS: ICD-10-CM

## 2021-03-29 DIAGNOSIS — Z20.822 ENCOUNTER FOR LABORATORY TESTING FOR COVID-19 VIRUS: ICD-10-CM

## 2021-03-29 LAB
ABO + RH BLD: NORMAL
ABO + RH BLD: NORMAL
BLD GP AB SCN SERPL QL: NORMAL
BLOOD BANK CMNT PATIENT-IMP: NORMAL
ERYTHROCYTE [DISTWIDTH] IN BLOOD BY AUTOMATED COUNT: 13.9 % (ref 10–15)
HCT VFR BLD AUTO: 31.3 % (ref 35–47)
HGB BLD-MCNC: 10.8 G/DL (ref 11.7–15.7)
LABORATORY COMMENT REPORT: NORMAL
MCH RBC QN AUTO: 30.5 PG (ref 26.5–33)
MCHC RBC AUTO-ENTMCNC: 34.5 G/DL (ref 31.5–36.5)
MCV RBC AUTO: 88 FL (ref 78–100)
PLATELET # BLD AUTO: 239 10E9/L (ref 150–450)
RBC # BLD AUTO: 3.54 10E12/L (ref 3.8–5.2)
SARS-COV-2 RNA RESP QL NAA+PROBE: NEGATIVE
SARS-COV-2 RNA RESP QL NAA+PROBE: NORMAL
SPECIMEN EXP DATE BLD: NORMAL
SPECIMEN SOURCE: NORMAL
SPECIMEN SOURCE: NORMAL
T PALLIDUM AB SER QL: NONREACTIVE
WBC # BLD AUTO: 10.8 10E9/L (ref 4–11)

## 2021-03-29 PROCEDURE — U0003 INFECTIOUS AGENT DETECTION BY NUCLEIC ACID (DNA OR RNA); SEVERE ACUTE RESPIRATORY SYNDROME CORONAVIRUS 2 (SARS-COV-2) (CORONAVIRUS DISEASE [COVID-19]), AMPLIFIED PROBE TECHNIQUE, MAKING USE OF HIGH THROUGHPUT TECHNOLOGIES AS DESCRIBED BY CMS-2020-01-R: HCPCS | Performed by: STUDENT IN AN ORGANIZED HEALTH CARE EDUCATION/TRAINING PROGRAM

## 2021-03-29 PROCEDURE — 76816 OB US FOLLOW-UP PER FETUS: CPT

## 2021-03-29 PROCEDURE — U0005 INFEC AGEN DETEC AMPLI PROBE: HCPCS | Performed by: STUDENT IN AN ORGANIZED HEALTH CARE EDUCATION/TRAINING PROGRAM

## 2021-03-29 PROCEDURE — G0463 HOSPITAL OUTPT CLINIC VISIT: HCPCS | Mod: 25

## 2021-03-29 PROCEDURE — 86900 BLOOD TYPING SEROLOGIC ABO: CPT | Performed by: OBSTETRICS & GYNECOLOGY

## 2021-03-29 PROCEDURE — 86780 TREPONEMA PALLIDUM: CPT | Performed by: OBSTETRICS & GYNECOLOGY

## 2021-03-29 PROCEDURE — 86850 RBC ANTIBODY SCREEN: CPT | Performed by: OBSTETRICS & GYNECOLOGY

## 2021-03-29 PROCEDURE — 76819 FETAL BIOPHYS PROFIL W/O NST: CPT | Mod: 26 | Performed by: OBSTETRICS & GYNECOLOGY

## 2021-03-29 PROCEDURE — 36415 COLL VENOUS BLD VENIPUNCTURE: CPT | Performed by: OBSTETRICS & GYNECOLOGY

## 2021-03-29 PROCEDURE — 99212 OFFICE O/P EST SF 10 MIN: CPT | Mod: 25 | Performed by: OBSTETRICS & GYNECOLOGY

## 2021-03-29 PROCEDURE — 76816 OB US FOLLOW-UP PER FETUS: CPT | Mod: 26 | Performed by: OBSTETRICS & GYNECOLOGY

## 2021-03-29 PROCEDURE — 85027 COMPLETE CBC AUTOMATED: CPT | Performed by: OBSTETRICS & GYNECOLOGY

## 2021-03-29 PROCEDURE — 86901 BLOOD TYPING SEROLOGIC RH(D): CPT | Performed by: OBSTETRICS & GYNECOLOGY

## 2021-03-29 PROCEDURE — 76819 FETAL BIOPHYS PROFIL W/O NST: CPT

## 2021-03-29 ASSESSMENT — PAIN SCALES - GENERAL: PAINLEVEL: NO PAIN (0)

## 2021-03-29 NOTE — PROGRESS NOTES
Maternal Fetal Medicine OB Follow-Up Visit     S:  Patient overall doing well and has no complaints or concerns. Endorses normal FM. Denies VB, LOF, or cxns.      O:   /70 (BP Location: Left arm, Patient Position: Chair)   Pulse 99   Resp 18   Wt 74.5 kg (164 lb 4.8 oz)   LMP 2020   SpO2 97%   BMI 28.20 kg/m        Gen Appear: NAD     MFM US  See report under imaging tab for details     A/P: 35 year old  at 38w5d by IUI dating here for CLINT.     Complex congenital cardiac defect  - Ebstein's anomaly with severely dilated RA, severe TR, no anterograde flow across mildly hypoplastic pulmonary valve   - Ductal dependent lesion  - s/p genetic amniocentesis consistent with 46, XX  - s/p pediatric cardiology prenatal echo  - s/p pediatric cardiothoracic surgery and NICU consults  -  echocardiogram immediately after delivery recommend per pediatric cardiology  - Delivery planned in 48 hours at 39 weeks.      Hypothyroidism  - Continue Synthroid 50 mcg  - TSH last checked on 2021. Patient continues to be asymptomatic.   - Given no dose adjustments have been made, will not re-check TSH at this time     Iron deficiency anemia  - Hgb 10.8, ferritin 11  - Received one infusion of Venofer, but developed abdominal pain as a result and does not desire to complete, which is reasonable.  - Continue PO iron. Discussed use of stool softeners, miralax, if needed.     Routine  - Rh positive  - s/p flu shot, TDap   - Discussed no contraindications to intercourse during pregnancy, if desired by patient.   - GBS negative. This was discussed with the patient today.  - Contraception:Discussed various birth control options including LARCs, Depot, birth control pills. Patient has used birth control pills in the past, but still undecided regarding options and will consider.  - Breastfeeding: Desires breastfeeding. Previously  first baby for 1 year.   - Delivery planning: CD at 39 weeks. Will have  type and screen done today.       Anthony Mei M.D.  Maternal Fetal Medicine    The patient had all her questions answered. She voiced understanding of the plan of care and her satisfaction with our care today. Thank you for the opportunity to participate in the care of Ms. Barboza. Please do not hesitate to contact us if you may have any questions or concerns.       I spent 0 minutes prior to the visit preparing to see the patient (reviewing medical records and tests).     I spent 15 minutes face-face-to-face with the patient during the visit with the majority spent on counseling and coordination of care with the patient and/or family members.     I spent 0 minutes after the visit with the patient documenting the visit in the electronic health record and/or communicating with other health care professionals and/or care coordination.   Total time spent on today s date of service: 15 minutes.

## 2021-03-29 NOTE — NURSING NOTE
Shilpa seen in clinic today for hydrops check, BPP, and OB visit at 38w5d gestation due to pregnancy c/b fetal CHD (see report/notes). VSS. Pt reports + FM. Pt denies bldg/lof/change in discharge/contractions/headache/vision changes/chest pain/SOB/edema. Reviewed ERAS packet, eating/drinking guidelines, boost and soap given as well. Answered patient questions. COVID test done this morning. Pre-op labs done today. Dr. Mei met with pt and discussed POC. Plan c/section delivery 3/31 0830am with arrival time of 0630am. Pt discharged stable and ambulatory.       Kirstin Kelly RN

## 2021-03-30 ENCOUNTER — ANESTHESIA EVENT (OUTPATIENT)
Dept: OBGYN | Facility: CLINIC | Age: 36
End: 2021-03-30
Payer: COMMERCIAL

## 2021-03-30 ENCOUNTER — ANESTHESIA (OUTPATIENT)
Dept: OBGYN | Facility: CLINIC | Age: 36
End: 2021-03-30
Payer: COMMERCIAL

## 2021-03-30 ENCOUNTER — TELEPHONE (OUTPATIENT)
Dept: MATERNAL FETAL MEDICINE | Facility: CLINIC | Age: 36
End: 2021-03-30

## 2021-03-30 ENCOUNTER — HOSPITAL ENCOUNTER (INPATIENT)
Facility: CLINIC | Age: 36
LOS: 3 days | Discharge: HOME | End: 2021-04-02
Attending: OBSTETRICS & GYNECOLOGY | Admitting: OBSTETRICS & GYNECOLOGY
Payer: COMMERCIAL

## 2021-03-30 DIAGNOSIS — O35.BXX0 ANOMALY OF HEART OF FETUS AFFECTING PREGNANCY, ANTEPARTUM, SINGLE OR UNSPECIFIED FETUS: ICD-10-CM

## 2021-03-30 PROBLEM — Z36.89 ENCOUNTER FOR TRIAGE IN PREGNANT PATIENT: Status: ACTIVE | Noted: 2021-03-30

## 2021-03-30 PROCEDURE — 272N000001 HC OR GENERAL SUPPLY STERILE: Performed by: OBSTETRICS & GYNECOLOGY

## 2021-03-30 PROCEDURE — C9290 INJ, BUPIVACAINE LIPOSOME: HCPCS | Performed by: STUDENT IN AN ORGANIZED HEALTH CARE EDUCATION/TRAINING PROGRAM

## 2021-03-30 PROCEDURE — 250N000011 HC RX IP 250 OP 636: Performed by: STUDENT IN AN ORGANIZED HEALTH CARE EDUCATION/TRAINING PROGRAM

## 2021-03-30 PROCEDURE — 258N000003 HC RX IP 258 OP 636: Performed by: STUDENT IN AN ORGANIZED HEALTH CARE EDUCATION/TRAINING PROGRAM

## 2021-03-30 PROCEDURE — 999N000141 HC STATISTIC PRE-PROCEDURE NURSING ASSESSMENT: Performed by: OBSTETRICS & GYNECOLOGY

## 2021-03-30 PROCEDURE — 250N000009 HC RX 250: Performed by: STUDENT IN AN ORGANIZED HEALTH CARE EDUCATION/TRAINING PROGRAM

## 2021-03-30 PROCEDURE — 271N000001 HC OR GENERAL SUPPLY NON-STERILE: Performed by: OBSTETRICS & GYNECOLOGY

## 2021-03-30 PROCEDURE — 360N000076 HC SURGERY LEVEL 3, PER MIN: Performed by: OBSTETRICS & GYNECOLOGY

## 2021-03-30 PROCEDURE — 250N000013 HC RX MED GY IP 250 OP 250 PS 637

## 2021-03-30 PROCEDURE — 250N000013 HC RX MED GY IP 250 OP 250 PS 637: Performed by: STUDENT IN AN ORGANIZED HEALTH CARE EDUCATION/TRAINING PROGRAM

## 2021-03-30 PROCEDURE — 370N000017 HC ANESTHESIA TECHNICAL FEE, PER MIN: Performed by: OBSTETRICS & GYNECOLOGY

## 2021-03-30 PROCEDURE — 258N000003 HC RX IP 258 OP 636

## 2021-03-30 PROCEDURE — 120N000002 HC R&B MED SURG/OB UMMC

## 2021-03-30 PROCEDURE — 710N000010 HC RECOVERY PHASE 1, LEVEL 2, PER MIN: Performed by: OBSTETRICS & GYNECOLOGY

## 2021-03-30 RX ORDER — ONDANSETRON 2 MG/ML
4 INJECTION INTRAMUSCULAR; INTRAVENOUS EVERY 30 MIN PRN
Status: DISCONTINUED | OUTPATIENT
Start: 2021-03-30 | End: 2021-03-30 | Stop reason: HOSPADM

## 2021-03-30 RX ORDER — KETOROLAC TROMETHAMINE 30 MG/ML
30 INJECTION, SOLUTION INTRAMUSCULAR; INTRAVENOUS EVERY 6 HOURS
Status: COMPLETED | OUTPATIENT
Start: 2021-03-30 | End: 2021-03-31

## 2021-03-30 RX ORDER — DIPHENHYDRAMINE HCL 25 MG
25 CAPSULE ORAL EVERY 6 HOURS PRN
Status: DISCONTINUED | OUTPATIENT
Start: 2021-03-30 | End: 2021-04-02 | Stop reason: HOSPADM

## 2021-03-30 RX ORDER — METOCLOPRAMIDE 10 MG/1
10 TABLET ORAL EVERY 6 HOURS PRN
Status: DISCONTINUED | OUTPATIENT
Start: 2021-03-30 | End: 2021-04-02 | Stop reason: HOSPADM

## 2021-03-30 RX ORDER — NALOXONE HYDROCHLORIDE 0.4 MG/ML
0.2 INJECTION, SOLUTION INTRAMUSCULAR; INTRAVENOUS; SUBCUTANEOUS
Status: DISCONTINUED | OUTPATIENT
Start: 2021-03-30 | End: 2021-03-30

## 2021-03-30 RX ORDER — FENTANYL CITRATE 50 UG/ML
15 INJECTION, SOLUTION INTRAMUSCULAR; INTRAVENOUS ONCE
Status: DISCONTINUED | OUTPATIENT
Start: 2021-03-30 | End: 2021-03-30

## 2021-03-30 RX ORDER — ACETAMINOPHEN 325 MG/1
975 TABLET ORAL EVERY 6 HOURS
Status: DISCONTINUED | OUTPATIENT
Start: 2021-03-30 | End: 2021-04-02 | Stop reason: HOSPADM

## 2021-03-30 RX ORDER — OXYTOCIN 10 [USP'U]/ML
10 INJECTION, SOLUTION INTRAMUSCULAR; INTRAVENOUS
Status: DISCONTINUED | OUTPATIENT
Start: 2021-03-30 | End: 2021-04-02 | Stop reason: HOSPADM

## 2021-03-30 RX ORDER — OXYTOCIN/0.9 % SODIUM CHLORIDE 30/500 ML
340 PLASTIC BAG, INJECTION (ML) INTRAVENOUS CONTINUOUS PRN
Status: DISCONTINUED | OUTPATIENT
Start: 2021-03-30 | End: 2021-04-02 | Stop reason: HOSPADM

## 2021-03-30 RX ORDER — KETOROLAC TROMETHAMINE 30 MG/ML
30 INJECTION, SOLUTION INTRAMUSCULAR; INTRAVENOUS
Status: COMPLETED | OUTPATIENT
Start: 2021-03-30 | End: 2021-03-30

## 2021-03-30 RX ORDER — SODIUM CHLORIDE, SODIUM LACTATE, POTASSIUM CHLORIDE, CALCIUM CHLORIDE 600; 310; 30; 20 MG/100ML; MG/100ML; MG/100ML; MG/100ML
INJECTION, SOLUTION INTRAVENOUS CONTINUOUS
Status: DISCONTINUED | OUTPATIENT
Start: 2021-03-30 | End: 2021-03-30 | Stop reason: HOSPADM

## 2021-03-30 RX ORDER — LIDOCAINE 40 MG/G
CREAM TOPICAL
Status: DISCONTINUED | OUTPATIENT
Start: 2021-03-30 | End: 2021-04-02 | Stop reason: HOSPADM

## 2021-03-30 RX ORDER — AMOXICILLIN 250 MG
2 CAPSULE ORAL 2 TIMES DAILY
Status: DISCONTINUED | OUTPATIENT
Start: 2021-03-30 | End: 2021-04-02 | Stop reason: HOSPADM

## 2021-03-30 RX ORDER — NALOXONE HYDROCHLORIDE 1 MG/ML
0.4 INJECTION INTRAMUSCULAR; INTRAVENOUS; SUBCUTANEOUS
Status: DISCONTINUED | OUTPATIENT
Start: 2021-03-30 | End: 2021-04-02 | Stop reason: HOSPADM

## 2021-03-30 RX ORDER — FENTANYL CITRATE-0.9 % NACL/PF 10 MCG/ML
PLASTIC BAG, INJECTION (ML) INTRAVENOUS CONTINUOUS PRN
Status: DISCONTINUED | OUTPATIENT
Start: 2021-03-30 | End: 2021-03-30

## 2021-03-30 RX ORDER — CARBOPROST TROMETHAMINE 250 UG/ML
250 INJECTION, SOLUTION INTRAMUSCULAR
Status: DISCONTINUED | OUTPATIENT
Start: 2021-03-30 | End: 2021-04-02 | Stop reason: HOSPADM

## 2021-03-30 RX ORDER — OXYCODONE HYDROCHLORIDE 5 MG/1
5 TABLET ORAL EVERY 4 HOURS PRN
Status: DISCONTINUED | OUTPATIENT
Start: 2021-03-30 | End: 2021-04-02 | Stop reason: HOSPADM

## 2021-03-30 RX ORDER — OXYTOCIN/0.9 % SODIUM CHLORIDE 30/500 ML
PLASTIC BAG, INJECTION (ML) INTRAVENOUS PRN
Status: DISCONTINUED | OUTPATIENT
Start: 2021-03-30 | End: 2021-03-30

## 2021-03-30 RX ORDER — BUPIVACAINE HYDROCHLORIDE 7.5 MG/ML
INJECTION, SOLUTION INTRASPINAL PRN
Status: DISCONTINUED | OUTPATIENT
Start: 2021-03-30 | End: 2021-03-30

## 2021-03-30 RX ORDER — BUPIVACAINE HYDROCHLORIDE 2.5 MG/ML
INJECTION, SOLUTION EPIDURAL; INFILTRATION; INTRACAUDAL PRN
Status: DISCONTINUED | OUTPATIENT
Start: 2021-03-30 | End: 2021-03-30

## 2021-03-30 RX ORDER — MORPHINE SULFATE 1 MG/ML
INJECTION, SOLUTION EPIDURAL; INTRATHECAL; INTRAVENOUS PRN
Status: DISCONTINUED | OUTPATIENT
Start: 2021-03-30 | End: 2021-03-30

## 2021-03-30 RX ORDER — ONDANSETRON 2 MG/ML
4 INJECTION INTRAMUSCULAR; INTRAVENOUS EVERY 6 HOURS PRN
Status: DISCONTINUED | OUTPATIENT
Start: 2021-03-30 | End: 2021-04-02 | Stop reason: HOSPADM

## 2021-03-30 RX ORDER — DEXTROSE, SODIUM CHLORIDE, SODIUM LACTATE, POTASSIUM CHLORIDE, AND CALCIUM CHLORIDE 5; .6; .31; .03; .02 G/100ML; G/100ML; G/100ML; G/100ML; G/100ML
INJECTION, SOLUTION INTRAVENOUS CONTINUOUS
Status: DISCONTINUED | OUTPATIENT
Start: 2021-03-30 | End: 2021-04-02 | Stop reason: HOSPADM

## 2021-03-30 RX ORDER — AMOXICILLIN 250 MG
1 CAPSULE ORAL 2 TIMES DAILY
Status: DISCONTINUED | OUTPATIENT
Start: 2021-03-30 | End: 2021-04-02 | Stop reason: HOSPADM

## 2021-03-30 RX ORDER — NALOXONE HYDROCHLORIDE 1 MG/ML
0.2 INJECTION INTRAMUSCULAR; INTRAVENOUS; SUBCUTANEOUS
Status: DISCONTINUED | OUTPATIENT
Start: 2021-03-30 | End: 2021-04-02 | Stop reason: HOSPADM

## 2021-03-30 RX ORDER — SIMETHICONE 80 MG
80 TABLET,CHEWABLE ORAL 4 TIMES DAILY PRN
Status: DISCONTINUED | OUTPATIENT
Start: 2021-03-30 | End: 2021-04-02 | Stop reason: HOSPADM

## 2021-03-30 RX ORDER — CEFAZOLIN SODIUM 2 G/100ML
2 INJECTION, SOLUTION INTRAVENOUS
Status: COMPLETED | OUTPATIENT
Start: 2021-03-30 | End: 2021-03-30

## 2021-03-30 RX ORDER — HYDROCORTISONE 2.5 %
CREAM (GRAM) TOPICAL 3 TIMES DAILY PRN
Status: DISCONTINUED | OUTPATIENT
Start: 2021-03-30 | End: 2021-04-02 | Stop reason: HOSPADM

## 2021-03-30 RX ORDER — FENTANYL CITRATE 50 UG/ML
25-50 INJECTION, SOLUTION INTRAMUSCULAR; INTRAVENOUS
Status: DISCONTINUED | OUTPATIENT
Start: 2021-03-30 | End: 2021-03-30 | Stop reason: HOSPADM

## 2021-03-30 RX ORDER — MISOPROSTOL 200 UG/1
800 TABLET ORAL
Status: DISCONTINUED | OUTPATIENT
Start: 2021-03-30 | End: 2021-04-02 | Stop reason: HOSPADM

## 2021-03-30 RX ORDER — METOCLOPRAMIDE HYDROCHLORIDE 5 MG/ML
10 INJECTION INTRAMUSCULAR; INTRAVENOUS EVERY 6 HOURS PRN
Status: DISCONTINUED | OUTPATIENT
Start: 2021-03-30 | End: 2021-04-02 | Stop reason: HOSPADM

## 2021-03-30 RX ORDER — NALBUPHINE HYDROCHLORIDE 10 MG/ML
2.5-5 INJECTION, SOLUTION INTRAMUSCULAR; INTRAVENOUS; SUBCUTANEOUS EVERY 6 HOURS PRN
Status: DISCONTINUED | OUTPATIENT
Start: 2021-03-30 | End: 2021-03-30

## 2021-03-30 RX ORDER — LIDOCAINE 40 MG/G
CREAM TOPICAL
Status: DISCONTINUED | OUTPATIENT
Start: 2021-03-30 | End: 2021-03-30

## 2021-03-30 RX ORDER — MORPHINE SULFATE 1 MG/ML
150 INJECTION, SOLUTION EPIDURAL; INTRATHECAL; INTRAVENOUS ONCE
Status: DISCONTINUED | OUTPATIENT
Start: 2021-03-30 | End: 2021-03-30

## 2021-03-30 RX ORDER — ONDANSETRON 4 MG/1
4 TABLET, ORALLY DISINTEGRATING ORAL EVERY 6 HOURS PRN
Status: DISCONTINUED | OUTPATIENT
Start: 2021-03-30 | End: 2021-03-30

## 2021-03-30 RX ORDER — BISACODYL 10 MG
10 SUPPOSITORY, RECTAL RECTAL DAILY PRN
Status: DISCONTINUED | OUTPATIENT
Start: 2021-04-01 | End: 2021-04-02 | Stop reason: HOSPADM

## 2021-03-30 RX ORDER — MODIFIED LANOLIN
OINTMENT (GRAM) TOPICAL
Status: DISCONTINUED | OUTPATIENT
Start: 2021-03-30 | End: 2021-04-02 | Stop reason: HOSPADM

## 2021-03-30 RX ORDER — NALOXONE HYDROCHLORIDE 0.4 MG/ML
0.4 INJECTION, SOLUTION INTRAMUSCULAR; INTRAVENOUS; SUBCUTANEOUS
Status: DISCONTINUED | OUTPATIENT
Start: 2021-03-30 | End: 2021-03-30

## 2021-03-30 RX ORDER — CEFAZOLIN SODIUM 1 G/3ML
1 INJECTION, POWDER, FOR SOLUTION INTRAMUSCULAR; INTRAVENOUS SEE ADMIN INSTRUCTIONS
Status: DISCONTINUED | OUTPATIENT
Start: 2021-03-30 | End: 2021-03-30 | Stop reason: HOSPADM

## 2021-03-30 RX ORDER — SODIUM CHLORIDE, SODIUM LACTATE, POTASSIUM CHLORIDE, CALCIUM CHLORIDE 600; 310; 30; 20 MG/100ML; MG/100ML; MG/100ML; MG/100ML
INJECTION, SOLUTION INTRAVENOUS CONTINUOUS PRN
Status: DISCONTINUED | OUTPATIENT
Start: 2021-03-30 | End: 2021-03-30

## 2021-03-30 RX ORDER — ONDANSETRON 4 MG/1
4 TABLET, ORALLY DISINTEGRATING ORAL EVERY 30 MIN PRN
Status: DISCONTINUED | OUTPATIENT
Start: 2021-03-30 | End: 2021-03-30 | Stop reason: HOSPADM

## 2021-03-30 RX ORDER — METHYLERGONOVINE MALEATE 0.2 MG/ML
200 INJECTION INTRAVENOUS
Status: DISCONTINUED | OUTPATIENT
Start: 2021-03-30 | End: 2021-04-02 | Stop reason: HOSPADM

## 2021-03-30 RX ORDER — FENTANYL CITRATE 50 UG/ML
INJECTION, SOLUTION INTRAMUSCULAR; INTRAVENOUS PRN
Status: DISCONTINUED | OUTPATIENT
Start: 2021-03-30 | End: 2021-03-30

## 2021-03-30 RX ORDER — EPHEDRINE SULFATE 50 MG/ML
5 INJECTION, SOLUTION INTRAMUSCULAR; INTRAVENOUS; SUBCUTANEOUS
Status: DISCONTINUED | OUTPATIENT
Start: 2021-03-30 | End: 2021-03-30

## 2021-03-30 RX ORDER — OXYTOCIN/0.9 % SODIUM CHLORIDE 30/500 ML
100 PLASTIC BAG, INJECTION (ML) INTRAVENOUS CONTINUOUS
Status: DISCONTINUED | OUTPATIENT
Start: 2021-03-30 | End: 2021-04-02 | Stop reason: HOSPADM

## 2021-03-30 RX ORDER — SODIUM CHLORIDE, SODIUM LACTATE, POTASSIUM CHLORIDE, CALCIUM CHLORIDE 600; 310; 30; 20 MG/100ML; MG/100ML; MG/100ML; MG/100ML
INJECTION, SOLUTION INTRAVENOUS
Status: COMPLETED
Start: 2021-03-30 | End: 2021-03-30

## 2021-03-30 RX ORDER — AZITHROMYCIN 500 MG/5ML
500 INJECTION, POWDER, LYOPHILIZED, FOR SOLUTION INTRAVENOUS
Status: COMPLETED | OUTPATIENT
Start: 2021-03-30 | End: 2021-03-30

## 2021-03-30 RX ORDER — ONDANSETRON 2 MG/ML
4 INJECTION INTRAMUSCULAR; INTRAVENOUS EVERY 6 HOURS PRN
Status: DISCONTINUED | OUTPATIENT
Start: 2021-03-30 | End: 2021-03-30

## 2021-03-30 RX ORDER — DIPHENHYDRAMINE HYDROCHLORIDE 50 MG/ML
25 INJECTION INTRAMUSCULAR; INTRAVENOUS EVERY 6 HOURS PRN
Status: DISCONTINUED | OUTPATIENT
Start: 2021-03-30 | End: 2021-04-02 | Stop reason: HOSPADM

## 2021-03-30 RX ORDER — CITRIC ACID/SODIUM CITRATE 334-500MG
30 SOLUTION, ORAL ORAL
Status: COMPLETED | OUTPATIENT
Start: 2021-03-30 | End: 2021-03-30

## 2021-03-30 RX ORDER — IBUPROFEN 800 MG/1
800 TABLET, FILM COATED ORAL EVERY 6 HOURS
Status: DISCONTINUED | OUTPATIENT
Start: 2021-03-31 | End: 2021-04-02 | Stop reason: HOSPADM

## 2021-03-30 RX ORDER — LEVOTHYROXINE SODIUM 50 UG/1
50 TABLET ORAL DAILY
Status: DISCONTINUED | OUTPATIENT
Start: 2021-03-30 | End: 2021-04-02 | Stop reason: HOSPADM

## 2021-03-30 RX ORDER — CITRIC ACID/SODIUM CITRATE 334-500MG
SOLUTION, ORAL ORAL
Status: COMPLETED
Start: 2021-03-30 | End: 2021-03-30

## 2021-03-30 RX ORDER — ACETAMINOPHEN 325 MG/1
975 TABLET ORAL ONCE
Status: DISCONTINUED | OUTPATIENT
Start: 2021-03-30 | End: 2021-03-30 | Stop reason: HOSPADM

## 2021-03-30 RX ADMIN — CEFAZOLIN 2 G: 10 INJECTION, POWDER, FOR SOLUTION INTRAVENOUS at 13:41

## 2021-03-30 RX ADMIN — SODIUM CHLORIDE, POTASSIUM CHLORIDE, SODIUM LACTATE AND CALCIUM CHLORIDE 1000 ML: 600; 310; 30; 20 INJECTION, SOLUTION INTRAVENOUS at 12:22

## 2021-03-30 RX ADMIN — KETOROLAC TROMETHAMINE 30 MG: 30 INJECTION, SOLUTION INTRAMUSCULAR at 15:04

## 2021-03-30 RX ADMIN — DOCUSATE SODIUM AND SENNOSIDES 2 TABLET: 8.6; 5 TABLET ORAL at 19:49

## 2021-03-30 RX ADMIN — MORPHINE SULFATE 0.15 MG: 1 INJECTION EPIDURAL; INTRATHECAL; INTRAVENOUS at 13:34

## 2021-03-30 RX ADMIN — BUPIVACAINE 20 ML: 13.3 INJECTION, SUSPENSION, LIPOSOMAL INFILTRATION at 14:33

## 2021-03-30 RX ADMIN — SODIUM CITRATE AND CITRIC ACID MONOHYDRATE 30 ML: 500; 334 SOLUTION ORAL at 13:16

## 2021-03-30 RX ADMIN — PHENYLEPHRINE HYDROCHLORIDE 100 MCG: 10 INJECTION INTRAVENOUS at 14:04

## 2021-03-30 RX ADMIN — ONDANSETRON 4 MG: 2 INJECTION INTRAMUSCULAR; INTRAVENOUS at 14:08

## 2021-03-30 RX ADMIN — ACETAMINOPHEN 975 MG: 325 TABLET, FILM COATED ORAL at 18:00

## 2021-03-30 RX ADMIN — SODIUM CHLORIDE, POTASSIUM CHLORIDE, SODIUM LACTATE AND CALCIUM CHLORIDE: 600; 310; 30; 20 INJECTION, SOLUTION INTRAVENOUS at 13:28

## 2021-03-30 RX ADMIN — BUPIVACAINE HYDROCHLORIDE IN DEXTROSE 1.6 MG: 7.5 INJECTION, SOLUTION SUBARACHNOID at 13:34

## 2021-03-30 RX ADMIN — FENTANYL CITRATE 15 MCG: 50 INJECTION, SOLUTION INTRAMUSCULAR; INTRAVENOUS at 13:34

## 2021-03-30 RX ADMIN — BUPIVACAINE HYDROCHLORIDE 20 ML: 2.5 INJECTION, SOLUTION EPIDURAL; INFILTRATION; INTRACAUDAL at 14:33

## 2021-03-30 RX ADMIN — KETOROLAC TROMETHAMINE 30 MG: 30 INJECTION, SOLUTION INTRAMUSCULAR; INTRAVENOUS at 21:06

## 2021-03-30 RX ADMIN — AZITHROMYCIN MONOHYDRATE 500 MG: 500 INJECTION, POWDER, LYOPHILIZED, FOR SOLUTION INTRAVENOUS at 13:06

## 2021-03-30 RX ADMIN — SODIUM CHLORIDE, POTASSIUM CHLORIDE, SODIUM LACTATE AND CALCIUM CHLORIDE: 600; 310; 30; 20 INJECTION, SOLUTION INTRAVENOUS at 14:42

## 2021-03-30 RX ADMIN — OXYTOCIN-SODIUM CHLORIDE 0.9% IV SOLN 30 UNIT/500ML 300 ML: 30-0.9/5 SOLUTION at 13:57

## 2021-03-30 RX ADMIN — Medication 50 MCG/MIN: at 13:35

## 2021-03-30 RX ADMIN — PHENYLEPHRINE HYDROCHLORIDE 100 MCG: 10 INJECTION INTRAVENOUS at 14:11

## 2021-03-30 RX ADMIN — Medication 30 ML: at 13:16

## 2021-03-30 ASSESSMENT — MIFFLIN-ST. JEOR: SCORE: 1442.97

## 2021-03-30 NOTE — ANESTHESIA CARE TRANSFER NOTE
Patient: Shilpa Barboza    Procedure(s):   SECTION    Diagnosis: Anomaly of heart of fetus affecting pregnancy, antepartum, single or unspecified fetus [O35.8XX0]  Diagnosis Additional Information: No value filed.    Anesthesia Type:   No value filed.     Note:    Oropharynx: spontaneously breathing  Level of Consciousness: awake  Oxygen Supplementation: room air    Independent Airway: airway patency satisfactory and stable  Dentition: dentition unchanged  Vital Signs Stable: post-procedure vital signs reviewed and stable  Report to RN Given: handoff report given  Patient transferred to: PACU    Handoff Report: Identifed the Patient, Identified the Reponsible Provider, Reviewed the pertinent medical history, Discussed the surgical course, Reviewed Intra-OP anesthesia mangement and issues during anesthesia, Set expectations for post-procedure period and Allowed opportunity for questions and acknowledgement of understanding      Vitals: (Last set prior to Anesthesia Care Transfer)  CRNA VITALS  3/30/2021 1408 - 3/30/2021 1452      3/30/2021             NIBP:  99/70    Pulse:  75    NIBP Mean:  77    Ht Rate:  75    SpO2:  98 %        Electronically Signed By: Yeimy Mai MD  2021  2:52 PM

## 2021-03-30 NOTE — DISCHARGE SUMMARY
DELIVERY DISCHARGE SUMMARY    Shilpa Barboza  : 1985  MRN: [id number]    Admit date: 3/30/2021  Discharge date: 2021    Admit Dx:   -  at 38w6d  - fetal Ebstein's anomaly  - hypothyroidism  - chronic anemia  - latent labor    Discharge Dx:  - Same as above, now  s/p procedure below  - Postoperative urinary retention, resolved    Procedures:  - Primary low transverse  section with double layer closure via Pfannenstiel incision  - Spinal anesthesia    Admit HPI:  Shilpa Barboza is a 35 year old  at 38w6d by IUI dating consistent with 6w1d US presenting with concerns regarding Ebstein's malformation with severe tricuspid valve regurgitation, hypoplastic pulmonary annulus with almost no antegrade flow across pulmonary valve and retrograde flow through the ducts arteriosus that was discovered on an abnormal fetal echocardiograpy. She's been followed by [facility] with cardiology and CT surgery. Had a planned  at 39&0, but came in 1 day early with concerns of . States that she began feeling contractions today roughly 15 minutes apart associated with bloody show. States that she noticed spotting in her underwear, with blood on tissue when wiping as well. States that these contractions feel similar to when she began labor with her first child. Denies headache, vision changes.     Consults:  Anesthesia  Lactation    Hospital course:  Shilpa Barboza is a 35 year old  at 38w6d admitted for spontaneous labor. Given that baby has a complex heart anomaly, cardiology was consulted. They recommended a controlled delivery with a  section. A  section was therefore recommended. The risks, benefits, and alternatives of  section were discussed with the patient including bleeding, infection, injury to surrounding structures (nerves, blood vessels, uterus, cervix, tubes, ovaries, bladder, bowel, rarely baby), and she agreed to  proceed. Written consent obtained.     1. Clear amniotic fluid  2. Liveborn female infant in left occiput transverse presentation. Apgars 8 at 1 minute & 8 at 5 minutes. Weight pending.  3. Normal uterus, fallopian tubes, and ovaries.     Please see her admit H&P for full details of her PMH, PSH, Meds, Allergies and exam on admit.    EBL from the delivery was 648 mL. Please see her  Section Operative Note for full details regarding her delivery.    Her postoperative course was complicated by postoperative urinary retention, which resolved on POD#1. She was continued on her home Synthroid. On POD#3, she was meeting all of her postpartum goals and deemed stable for discharge. She was voiding without difficulty, tolerating a regular diet without nausea and vomiting, her pain was well controlled on oral pain medicines and her lochia was appropriate. Her hemoglobin prior to delivery was 10.8 and after delivery was 9.5. Her Rh status was positive and Rhogam was not indicated.      HGB  Recent Labs   Lab /  0717 /  1240   HGB 9.5* 10.8*       Discharge Medications:     Review of your medicines      START taking      Dose / Directions   acetaminophen 325 MG tablet  Commonly known as: TYLENOL  Used for:  (normal spontaneous vaginal delivery)      Dose: 650 mg  Take 2 tablets (650 mg) by mouth every 6 hours as needed for mild pain Start after Delivery.  Quantity: 100 tablet  Refills: 0     ibuprofen 600 MG tablet  Commonly known as: ADVIL/MOTRIN  Used for:  (normal spontaneous vaginal delivery)      Dose: 600 mg  Take 1 tablet (600 mg) by mouth every 6 hours as needed for moderate pain Start after delivery  Quantity: 60 tablet  Refills: 0     senna-docusate 8.6-50 MG tablet  Commonly known as: SENOKOT-S/PERICOLACE  Used for:  (normal spontaneous vaginal delivery)      Dose: 1 tablet  Take 1 tablet by mouth daily Start after delivery.  Quantity: 100 tablet  Refills: 0        CONTINUE these  medicines which may have CHANGED, or have new prescriptions. If we are uncertain of the size of tablets/capsules you have at home, strength may be listed as something that might have changed.      Dose / Directions   * iron 325 (65 Fe) MG tablet  This may have changed: Another medication with the same name was added. Make sure you understand how and when to take each.      Dose: 1 tablet  Take 1 tablet by mouth daily  Refills: 0     * ferrous sulfate 325 (65 Fe) MG tablet  Commonly known as: FEROSUL  This may have changed: You were already taking a medication with the same name, and this prescription was added. Make sure you understand how and when to take each.  Used for:  (normal spontaneous vaginal delivery)      Dose: 325 mg  Take 1 tablet (325 mg) by mouth daily (with breakfast)  Quantity: 60 tablet  Refills: 0         * This list has 2 medication(s) that are the same as other medications prescribed for you. Read the directions carefully, and ask your doctor or other care provider to review them with you.            CONTINUE these medicines which have NOT CHANGED      Dose / Directions   ascorbic acid 250 MG Chew chewable tablet  Commonly known as: vitamin C      Dose: 250 mg  Take 250 mg by mouth daily  Refills: 0     CALCIUM-VITAMIN D PO      Refills: 0     levothyroxine 50 MCG tablet  Commonly known as: SYNTHROID/LEVOTHROID      Dose: 50 mcg  Take 50 mcg by mouth daily  Refills: 0     MAGNESIUM PO      Dose: 1 tablet  Take 1 tablet by mouth daily  Refills: 0     prenatal multivitamin w/iron 27-0.8 MG tablet      Dose: 1 tablet  Take 1 tablet by mouth daily  Refills: 0     riboflavin 100 MG Tabs tablet  Commonly known as: vitamin  B-2      Dose: 4 tablet  Take 4 tablets by mouth daily  Refills: 0     VITAMIN B-12 CR PO      Refills: 0        STOP taking    UNKNOWN TO PATIENT              Where to get your medicines      These medications were sent to [facility] [place], [place] - [address]  [address] [facility] [address]    Phone: [phone]     acetaminophen 325 MG tablet    ferrous sulfate 325 (65 Fe) MG tablet    ibuprofen 600 MG tablet    senna-docusate 8.6-50 MG tablet         Discharge/Disposition:  Shilpa Barboza was discharged to home in stable condition with the following instructions/medications:  1) Call for temperature >100.4, bright red vaginal bleeding >1 pad an hour x 2 hours, foul smelling vaginal discharge, pain not controlled by usual oral pain meds, persistent nausea and vomiting not controlled on medications, drainage or redness from incision site  2) She was undecided for contraception.  3) For feeding she decided to breastfeed.  4) She was instructed to follow-up with her primary OB in 6 weeks for a routine postpartum visit.  5) Discharge activity:  No heavy lifting >15 lbs or strenuous activity for 6 weeks, pelvic rest for 6 weeks, no driving or operating machinery while on narcotics.    aMrika Cade MD  Ob/Gyn Resident, PGY-3  04/02/21 6:35 AM     [facility] staff:  Appreciate note by Dr. Cade.  I have seen and examined the patient without the resident. I have reviewed, edited, and agree with the note.        Mine Sousa MD, FACOG  4/2/2021  1:40 PM

## 2021-03-30 NOTE — ANESTHESIA PROCEDURE NOTES
Intrathecal injection Procedure Note  Pre-Procedure   Staff -        Anesthesiologist:  Natalio Walters MD       Resident/Fellow: Yeimy Mai MD       Performed By: resident       Location: OB       Pre-Anesthestic Checklist: patient identified, IV checked, risks and benefits discussed, informed consent, monitors and equipment checked, pre-op evaluation, at physician/surgeon's request and post-op pain management  Timeout:       Correct Patient: Yes        Correct Procedure: Yes        Correct Site: Yes        Correct Position: Yes   Procedure Documentation  Procedure: intrathecal injection       Patient Position: sitting       Skin prep: Chloraprep       Insertion Site: L2-3. (midline approach).       Needle Gauge: 25.        Needle Length (Inches): 4        Spinal Needle Type: Pencan       Introducer used       Introducer: 20 G       # of attempts: 1 and  # of redirects:  0    Assessment/Narrative         Paresthesias: No.       Sensory Level: T5       CSF fluid: clear.      Opening pressure was cmH2O while  Sitting.      Comments:  bilaterally

## 2021-03-30 NOTE — ANESTHESIA PROCEDURE NOTES
TAP Procedure Note  Pre-Procedure   Staff -        Anesthesiologist:  Natalio Walters MD       Resident/Fellow: Yeimy Mai MD       Performed By: resident       Location: OB       Pre-Anesthestic Checklist: patient identified, IV checked, site marked, risks and benefits discussed, informed consent, monitors and equipment checked, pre-op evaluation, at physician/surgeon's request and post-op pain management  Timeout:       Correct Patient: Yes        Correct Procedure: Yes        Correct Site: Yes        Correct Position: Yes        Correct Laterality: Yes        Site Marked: Yes  Procedure Documentation  Procedure: TAP       Diagnosis: POST OPERATIVE PAIN       Laterality: bilateral       Patient Position: supine       Patient Prep/Sterile Barriers: sterile gloves, mask       Skin prep: Chloraprep       Insertion Site: T6-7.       Needle Type: short bevel       Needle Gauge: 21.        Needle Length (millimeters): 110        - Ultrasound guided       - Ultrasound used to identify targeted nerve, plexus, vascular marker, or fascial plane and place a needle adjacent to it in real-time       - Ultrasound was used to visualize the spread of anesthetic in close proximity to the above referenced structure       - A permanent image is entered into the patient's record.    Assessment/Narrative         The placement was negative for: blood aspirated, painful injection and site bleeding       Paresthesias: No.      Bolus given via needle. No blood aspirated via catheter.        Secured via.        Insertion/Infusion Method: Single Shot       Complications: none       Injection made incrementally with aspirations every 5 mL.

## 2021-03-30 NOTE — OP NOTE
[facility]  Full Operative Progress Note     Surgery Date:  3/30/2021    Surgeon:  Liliane Garg MD    Assistants:  Martinez Mathew MD PGY-2      Pre-op Diagnosis:    - Intrauterine pregnancy at 38w6d  - Fetal complex heart anomaly      Post-op Diagnosis:    - Same   - Liveborn female infant     Procedure: Primary low-transverse  section with double layer uterine closure via pfannenstiel incision    Anesthesia: Spinal  EBL: 648 ml   IVF: 1000 mL crystalloid  UOP: 75 mL clear urine at the end of the case  Drains: Felipe Catheter   Specimens: Routine cord blood, cord segment  Complications: None apparent    Indications:   Rey Barboza is a 35 year old  at 38w6d admitted for spontaneous labor. Given that baby has a complex heart anomaly, cardiology was consulted. They recommended a controlled delivery with a  section. A  section was therefore recommended. The risks, benefits, and alternatives of  section were discussed with the patient including bleeding, infection, injury to surrounding structures (nerves, blood vessels, uterus, cervix, tubes, ovaries, bladder, bowel, rarely baby), and she agreed to proceed. Written consent obtained.     Findings:   1. Clear amniotic fluid  2. Liveborn female infant in left occiput transverse presentation. Apgars 8 at 1 minute & 8 at 5 minutes. Weight pending.  3. Normal uterus, fallopian tubes, and ovaries.     Results for FABIENNE BARBOZA-REY (MRN [id number]) as of 3/30/2021 17:18   Ref. Range 3/30/2021 13:54   Base Excess Venous Latest Ref Range: 0.0 - 1.9 mmol/L 1.1   Ph Cord Arterial Latest Ref Range: 7.16 - 7.39 pH 7.31   PCO2 Cord Arterial Latest Ref Range: 35 - 71 mm Hg 55   PO2 Cord Arterial Latest Ref Range: 3 - 33 mm Hg 10   Bicarbonate Cord Arterial Latest Ref Range: 16 - 24 mmol/L 28 (H)   Ph Cord Blood Venous Latest Ref Range: 7.21 - 7.45 pH 7.40   PCO2 Cord Venous Latest Ref Range: 27 - 57  mm Hg 43   PO2 Cord Venous Latest Ref Range: 21 - 37 mm Hg 23   Bicarbonate Cord Venous Latest Ref Range: 16 - 24 mmol/L 26 (H)     Results for ELANA HOPKINS (MRN [id number]) as of 3/30/2021 17:18   Ref. Range 3/30/2021 15:25   FIO2 Unknown 21   Ph Venous Latest Ref Range: 7.32 - 7.43 pH 7.33   PCO2 Venous Latest Ref Range: 40 - 50 mm Hg 53 (H)   PO2 Venous Latest Ref Range: 25 - 47 mm Hg 33   Bicarbonate Venous Latest Ref Range: 16 - 24 mmol/L 28 (H)   Base Excess Venous Latest Ref Range: 0.0 - 1.9 mmol/L 0.3       Procedure Details:   The patient was brought to the OR, where adequate spinal anesthesia was administered.  She was placed in the dorsal supine position with a slight leftward tilt. She was prepped and draped in the usual sterile fashion. A surgical time out was performed. A pfannenstiel skin incision was made with the scalpel, and carried down to the underlying fascia with sharp and blunt dissection. The fascia was incised in the midline, and the incision was extended laterally with the Bragg scissors. The superior aspect of the fascia was grasped with the Kocher clamps and dissected off of the underlying rectus muscles with blunt and sharp dissection. Attention was then turned to the inferior aspect of the fascia, which was similarly dissected off of the underlying rectus muscles. The rectus muscles were  in the midline, and the peritoneum was entered bluntly, and the opening was extended with digital pressure and electrosurgery. The bladder blade was placed. The vesicouterine peritoneum was incised in the midline, and the incision was extended laterally with the Metzenbaum scissors. A bladder flap was created digitally and the bladder blade was replaced. A transverse hysterotomy was made with the scalpel in the lower uterine segment, and the incision was extended with digital pressure. The infant was noted to be in the left occiput transverse position, and was delivered  atraumatically. The shoulders delivered easily. No nuchal cord was noted. The cord was doubly clamped and cut after 60 seconds, and the infant was handed off to the awaiting nursery staff. A segment of cord was cut and collected. The placenta was delivered with gentle traction on the umbilical cord and uterine massage. The uterus was exteriorized and cleared of all clots and debris. Uterine tone was noted to be firm with pitocin given through the running IV and uterine massage.  The hysterotomy was closed with a running locked suture of Monocryl.  The hysterotomy was then imbricated using an 0 Monocryl suture. The hysterotomy was noted to be hemostatic. The posterior cul-de-sac was cleared of all clots and debris. The uterus was returned to the abdomen. The pericolic gutters were cleared of all clots and debris. The hysterotomy was reexamined and noted to be hemostatic. The fascia and rectus muscles were examined and areas of oozing were controlled with electrocautery. The fascia was closed with a running 0 Vicryl suture. The subcutaneous tissue was irrigated and areas of oozing were controlled with electrocautery. The subcutaneous tissue was less than 2 cm in thickness, and was therefore not closed. The skin was closed with 4-0 Monocryl and covered with a sterile dressing.    All sponge, needle, and instrument counts were correct. The patient tolerated the procedure well, and was transferred to recovery in stable condition. Dr. Garg was present and scrubbed for the procedure.     Martinez Mathew MD  OB/GYN PGY-2  03/30/21 5:15 PM  Liliane Garg MD

## 2021-03-30 NOTE — PLAN OF CARE
OR to PACU Transfer Note  Data: Pt to OB PACU at 1440 via cart. PIV infusing without complications, armando with clear yellow urine to gravity,VSS, pt does not complain of pain and/or nausea.   Interventions: IV to pump, monitors and alarms on, SCD on.  Response: stable condition.  Plan: Patient instructed to notify RN for pain or nausea, routine post op cares, pumping initiated, report given to Tatianna RUFFIN RN, will stop in NICU during transfer.

## 2021-03-30 NOTE — PLAN OF CARE
VSS. Pt denies pain at this time, but is taking Tylenol and Toradol as scheduled. Pt pumping and hand expressing for infant in the NICU. Pt stated she felt some dizziness when sitting up in bed and pumping. Pt drinking and taking small bites of crackers and muffin.  Felipe catheter out at 1630. Plan to get patient up to the bathroom at 1930. Uterus firm and at level of umbilicus. Incisional dressing with dried serosanguineous drainage noted. Drainage marked and unchanged since transfer to unit. Significant other, Breezy, present and attentive at bedside.

## 2021-03-30 NOTE — PROGRESS NOTES
Patient arrived to [facility] unit via zoom cart at 1605,with belongings, accompanied by spouse/ significant other, with infant in NICU. Received report from Caryn Quinn RN and checked bands. Unit and room orientation completd. Call light given and within arms reach; no concerns present at this time. Continue with plan of care.

## 2021-03-30 NOTE — ANESTHESIA PREPROCEDURE EVALUATION
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, antepartum, single or unspecified fetus [O35.8XX0]   Procedure : Procedure(s):   SECTION     Past Medical History:   Diagnosis Date     Anemia      Depressive disorder      Hx of previous reproductive problem      Mental disorder     Anxiety/Depression      Past Surgical History:   Procedure Laterality Date     DENTAL SURGERY      wisdom tooth extraction     DILATION AND CURETTAGE SUCTION N/A 2016    Procedure: DILATION AND CURETTAGE SUCTION;  Surgeon: Yariel Santana MD;  Location: [facility] OR      No Known Allergies   Social History     Tobacco Use     Smoking status: Never Smoker     Smokeless tobacco: Never Used   Substance Use Topics     Alcohol use: No      Wt Readings from Last 1 Encounters:   21 75.3 kg (166 lb)        Anesthesia Evaluation   Pt has had prior anesthetic. Type: Regional and OB Labor Epidural.    No history of anesthetic complications       ROS/MED HX  ENT/Pulmonary:  - neg pulmonary ROS     Neurologic:  - neg neurologic ROS     Cardiovascular:  - neg cardiovascular ROS     METS/Exercise Tolerance: >4 METS    Hematologic:  - neg hematologic  ROS   (+) anemia,     Musculoskeletal:       GI/Hepatic:  - neg GI/hepatic ROS     Renal/Genitourinary:  - neg Renal ROS     Endo:  - neg endo ROS   (+) thyroid problem, hypothyroidism,     Psychiatric/Substance Use:  - neg psychiatric ROS     Infectious Disease:  - neg infectious disease ROS     Malignancy:  - neg malignancy ROS     Other: Comment: Fetus with Ebstein's anomaly with severely dilated RA, severe TR,   Fetus with Ebstein's anomaly with severely dilated RA, severe TR,    (+) Possibly pregnant, ,         Physical Exam    Airway        Mallampati: II   TM distance: > 3 FB   Neck ROM: full   Mouth opening: > 3 cm    Respiratory Devices and  Support         Dental  no notable dental history         Cardiovascular   cardiovascular exam normal       Rhythm and rate: regular and normal     Pulmonary   pulmonary exam normal        breath sounds clear to auscultation           OUTSIDE LABS:  CBC:   Lab Results   Component Value Date    WBC 10.8 03/29/2021    WBC 13.8 (H) 03/01/2021    HGB 10.8 (L) 03/29/2021    HGB 10.8 (L) 03/01/2021    HCT 31.3 (L) 03/29/2021    HCT 31.4 (L) 03/01/2021     03/29/2021     03/01/2021     BMP: No results found for: NA, POTASSIUM, CHLORIDE, CO2, BUN, CR, GLC  COAGS: No results found for: PTT, INR, FIBR  POC: No results found for: BGM, HCG, HCGS  HEPATIC: No results found for: ALBUMIN, PROTTOTAL, ALT, AST, GGT, ALKPHOS, BILITOTAL, BILIDIRECT, DENNY  OTHER: No results found for: PH, LACT, A1C, NEGRITA, PHOS, MAG, LIPASE, AMYLASE, TSH, T4, T3, CRP, SED    Anesthesia Plan    ASA Status:  1      Anesthesia Type: Spinal.              Consents    Anesthesia Plan(s) and associated risks, benefits, and realistic alternatives discussed. Questions answered and patient/representative(s) expressed understanding.     - Discussed with:  Patient         Postoperative Care            Comments:                Yeimy Mai MD

## 2021-03-30 NOTE — TELEPHONE ENCOUNTER
Shilpa called today reporting that she has noticed some mild cramping this morning while cleaning, but has not timed them. She also noticed bloody show when wiping. Pt instructed to come in to L&D for labor evaluation. Where to park and check in reviewed with pt. L&D charge nurse and Dr. Colon notified pt is on her way.  Lindsay Salas RN

## 2021-03-30 NOTE — PLAN OF CARE
Data: Patient presented to [facility] at 1104.   Reason for maternal/fetal assessment per patient is Rule Out Labor  .  Patient is a . Prenatal record reviewed.      OB History    Para Term  AB Living   3 2 2 0 1 2   SAB TAB Ectopic Multiple Live Births   1 0 0 0 2      # Outcome Date GA Lbr Eliceo/2nd Weight Sex Delivery Anes PTL Lv   3 Term 21 38w6d   F CS-LTranv Spinal  MARVA      Name: SANDEEPFEMALE-REY      Apgar1: 8  Apgar5: 8   2 Term 18 39w3d 07:52 / 01:53 3.49 kg (7 lb 11.1 oz) F Vag-Spont EPI  MARVA      Name: SANDEEP,BABY1 REY      Apgar1: 8  Apgar5: 8   1 SAB  8w0d          . Medical history:   Past Medical History:   Diagnosis Date     Anemia      Depressive disorder      Hx of previous reproductive problem      Mental disorder     Anxiety/Depression   . Pt present to  for labor assessment.  Pt reports cramping and light bloody show.  Gestational Age 38w6d. VSS. Fetal movement present. Patient denies backache, vaginal discharge, pelvic pressure, UTI symptoms, GI problems, edema, headache, visual disturbances, epigastric or URQ pain, abdominal pain, rupture of membranes. Support persons  present.  Action: Verbal consent for EFM. Triage assessment completed. EFM applied for fetal tracing. Uterine assessment toco. Fetal assessment: Presumed adequate fetal oxygenation documented (see flow record).   Response: Dr. Colon informed of pt arrival and assessed pt. Plan per provider is c/s today for early labor.  NICU aware. Patient verbalized agreement with plan.

## 2021-03-31 LAB — HGB BLD-MCNC: 9.5 G/DL (ref 11.7–15.7)

## 2021-03-31 PROCEDURE — 120N000002 HC R&B MED SURG/OB UMMC

## 2021-03-31 PROCEDURE — 250N000011 HC RX IP 250 OP 636: Performed by: STUDENT IN AN ORGANIZED HEALTH CARE EDUCATION/TRAINING PROGRAM

## 2021-03-31 PROCEDURE — 250N000013 HC RX MED GY IP 250 OP 250 PS 637: Performed by: STUDENT IN AN ORGANIZED HEALTH CARE EDUCATION/TRAINING PROGRAM

## 2021-03-31 PROCEDURE — 36415 COLL VENOUS BLD VENIPUNCTURE: CPT | Performed by: STUDENT IN AN ORGANIZED HEALTH CARE EDUCATION/TRAINING PROGRAM

## 2021-03-31 PROCEDURE — 85018 HEMOGLOBIN: CPT | Performed by: STUDENT IN AN ORGANIZED HEALTH CARE EDUCATION/TRAINING PROGRAM

## 2021-03-31 PROCEDURE — 59514 CESAREAN DELIVERY ONLY: CPT | Mod: GC | Performed by: OBSTETRICS & GYNECOLOGY

## 2021-03-31 RX ADMIN — IBUPROFEN 800 MG: 800 TABLET ORAL at 17:20

## 2021-03-31 RX ADMIN — KETOROLAC TROMETHAMINE 30 MG: 30 INJECTION, SOLUTION INTRAMUSCULAR; INTRAVENOUS at 03:08

## 2021-03-31 RX ADMIN — ACETAMINOPHEN 975 MG: 325 TABLET, FILM COATED ORAL at 06:08

## 2021-03-31 RX ADMIN — LEVOTHYROXINE SODIUM 50 MCG: 50 TABLET ORAL at 07:45

## 2021-03-31 RX ADMIN — IBUPROFEN 800 MG: 800 TABLET ORAL at 23:42

## 2021-03-31 RX ADMIN — ACETAMINOPHEN 975 MG: 325 TABLET, FILM COATED ORAL at 14:12

## 2021-03-31 RX ADMIN — ACETAMINOPHEN 975 MG: 325 TABLET, FILM COATED ORAL at 20:25

## 2021-03-31 RX ADMIN — DOCUSATE SODIUM AND SENNOSIDES 2 TABLET: 8.6; 5 TABLET ORAL at 20:25

## 2021-03-31 RX ADMIN — KETOROLAC TROMETHAMINE 30 MG: 30 INJECTION, SOLUTION INTRAMUSCULAR; INTRAVENOUS at 10:23

## 2021-03-31 RX ADMIN — DOCUSATE SODIUM AND SENNOSIDES 2 TABLET: 8.6; 5 TABLET ORAL at 07:45

## 2021-03-31 RX ADMIN — ACETAMINOPHEN 975 MG: 325 TABLET, FILM COATED ORAL at 01:56

## 2021-03-31 NOTE — PLAN OF CARE
Post void residual was approx range of 167-244ml. MD notified in regards to placing armando catheter.

## 2021-03-31 NOTE — ANESTHESIA POSTPROCEDURE EVALUATION
Patient: Shilpa Barboza    Procedure(s):   SECTION    Diagnosis:Anomaly of heart of fetus affecting pregnancy, antepartum, single or unspecified fetus [O35.8XX0]  Diagnosis Additional Information: No value filed.    Anesthesia Type:  Spinal    Note:  Disposition: Admission   Postop Pain Control: Uneventful            Sign Out: Well controlled pain   PONV: No   Neuro/Psych:    Airway/Respiratory: Uneventful            Sign Out: Acceptable/Baseline resp. status   CV/Hemodynamics: Uneventful            Sign Out: Acceptable CV status   Other NRE: NONE   DID A NON-ROUTINE EVENT OCCUR? No         Last vitals:  Vitals:    21 2205 21 0158 21 0609   BP: 102/68 102/58 101/63   Pulse: 81 88 73   Resp: 16 16 16   Temp: 36.8  C (98.2  F) 36.6  C (97.9  F) 36.8  C (98.2  F)   SpO2: 100% 98% 99%       Last vitals prior to Anesthesia Care Transfer:  CRNA VITALS  3/30/2021 1408 - 3/30/2021 1508      3/30/2021             NIBP:  99/70    Pulse:  75    NIBP Mean:  77    Ht Rate:  75    SpO2:  98 %          Electronically Signed By: Natalio Walters MD  2021  6:28 AM

## 2021-03-31 NOTE — PLAN OF CARE
Data: Vital signs within normal limits. Postpartum checks within normal limits - see flow record. Patient eating and drinking normally. Patient up ad jaime. Straight cath at 2200 for 125mL. Patient was able to void at 0200 but continued to have large residuals. MD notified and order received to straight cath again at 0500 for 475mL. Plan to continue monitoring residuals. Will place armando if large residuals remain. No apparent signs of infection. Incision healing well. Large amount of serosanguinous drainage on bandage, provider notified and dressing changed. Patient performing self cares and is able to visit infant in NICU.  Action: Patient medicated during the shift for pain with tylenol and toradol. See MAR. Patient reassessed within 1 hour after each medication and pain was improved - patient stated she was comfortable. Patient education done about importance of emptying bladder. See flow record.  Response: Positive attachment behaviors observed about infant. Support persons Breezy present.   Plan: Anticipate discharge on 4/1/21.

## 2021-03-31 NOTE — PROGRESS NOTES
"Post  Anesthesia Follow Up Note    Patient: Shilpa Barboza    Patient location: Postpartum floor.    Chief complaint: Acute postoperative pain management s/p intrathecal morphine, fentanyl, and local anesthetic administration     Procedure(s) Performed:  Procedure(s):   SECTION    Anesthesia type: Spinal Block    Subjective:     Pain Control:     Additional ROS:  She does not complain of pruritis at this time. She denies weakness, denies paresthesia, admits to difficulties with voiding, denies nausea or vomiting, denies difficulty breathing. She is able to ambulate and tolerates regular diet.    Objective:    Respiratory Function (RR / SpO2 / Airway Patency): Satisfactory    Cardiac Function (HR / Rhythm / BP): Satisfactory    Strength and sensation lower extremities: Normal    Site of spinal/epidural insertion: No signs of infection or inflammation.     Last Vitals: /71   Pulse 74   Temp 36.4  C (97.6  F) (Axillary)   Resp 16   Ht 1.626 m (5' 4\")   Wt 75.3 kg (166 lb)   LMP 2020   SpO2 100%   Breastfeeding Unknown   BMI 28.49 kg/m      Assessment and plan:   Shilpa Barboza is a 35 year old female  POD #1 s/p  IT bupivacaine (1.6mg), fentanyl (15mcg) and morphine (150 mcg); and single shot TAP nerve block injections with 20 mL bupivacaine 0.25% with epinephrine 1:200,000, then 20mL liposome bupivacaine (Exparel) long-acting 1.3% for postoperative analgesia.  Pt is ambulating without difficulty, no weakness or paresthesias.      She does endorse some difficulty with voiding, which may be a result of the procedure or lingering effect of neuraxial anesthesia. It is reassuring she does not have any other concerning neurological findings, and I would anticipate this to improve in the next 24 hrs. There is no other  evidence of adverse side effects associated with spinal and nerve block injections.  The patient is receiving adequate incisional pain control at this " time and anticipate up to 72 hours of incisional pain control.  However, we further anticipate that the patient may require opioid/nonopioid analgesics for visceral and muscle pain that is not controlled with local anesthetic.      In brief summary, her post-operative analgesia is adequate today. Further interventional analgesic strategies would be of little utility at this time. Thus, we recommend proceeding with PO analgesics including staggered dosing of NSAIDs (ibuprofen) and acetaminophen, with a taper of oxycodone.     Thank you for including us in the care for this patient.    Yeimy Mai MD  Anesthesiology Resident, CA-1

## 2021-03-31 NOTE — CONSULTS
"[facility]  MATERNAL CHILD HEALTH   INITIAL NICU PSYCHOSOCIAL ASSESSMENT      DATA:      Presenting Information: Mom is a 35 year old  who delivered an infant female, \"Delmis\" on 3/30/2021 at 38w6d gestation in the setting of known fetal heart defect and  due to unknown tolerance to labor. Baby was admitted to the NICU for evaluation and management of prenatally-known Ebstein's Anomaly.  SW was consulted to meet with this family per NICU admission of infant.      Living Situation: Parents are Shilpa and Breezy () who live together in [place], along with their older daughter, Kaylee (~3yo).     Social Support: Parents have strong social support from friends, family, and their akbar community.     Education/Employment: Mom expresses no concerns about having time off to be present to this hospitalization; Dad has one week off, and expresses his employer has been understanding and is flexible about his return to work.  He primarily works from home.     Insurance: Parents have [employer] insurance and SW provided information about adding baby to insurance.     Source of Financial Support: Parental employment      Mental Health History: None indicated.     History of Postpartum Mood Disorders: None     Chemical Health History: None indicated     Legal/Child Protection Involvement: None indicated     INTERVENTION:        Chart review    Collaboration with team: TONY Ortiz    Conducted Psychosocial Assessment    Introduction to Maternal Child Health SW role and scope of practice    Orientation to the NICU (parking, lodging, meals, visitation)    Validated emotions and provided supportive listening    Provided psychoeducation on  mood disorders and indicated that SW would continue to monitor mood and support bridging to mental health resources as needed.    Provided SW contact info     ASSESSMENT:      Coping: Parents are coping appropriately with this " hospitalization. Dad is more expressive about how surreal and stressful it feels, but identifies that they are leaning hard on their akbar and friends, and are maintaining a positive attitude and are grateful for the things that have turned out well so far.     Risk Factors: other children at home needing care and attention and hospitalization during global pandemic     Resiliency Factors & Strengths: local family, experienced parents, good social support, parental employment, housing stability, reliable transportation, willingness to ask for/accept help, willingness to have vulnerable conversations about emotions and spiritual support     PLAN:      SW will continue to follow for supportive intervention.     Tara Quinones [facility] HospitalJUDITH  Clinical   Maternal Child Health  [facility]  Direct: [phone]  Pager: [phone]  After Hours SW: [phone]

## 2021-03-31 NOTE — PROVIDER NOTIFICATION
03/31/21 0350   Provider Notification   Provider Name/Title Dr. Solis G2   Method of Notification Electronic Page   Request Evaluate-Remote   Notification Reason Status Update     Pt having issues with urinary retention. Has voided x2 but still has >400mls with bladder scan. What would you like me to do? Thanks, Sherrill [id number]

## 2021-03-31 NOTE — LACTATION NOTE
"This note was copied from a baby's chart.  D:  I met with Delmis Massey is her 2nd baby. She  her 2 year old for about 1 year, using some donor milk supplementation. She is normally in good health with exception of recent diagnosis of hypothyroidism (diagnosed just before pregnancy) for which she takes levothyroxine. She also has a diagnosis of anemia related to pregnancy, and has no history of breast/chest surgery or trauma.  She has already started to pump, and is using hand expression following pumping; she is getting drops up to one small puddle. Shilpa has used a hands free pumping bra previously and brought hers with her to the hospital.  I:  I gave her a folder of introductory materials and went over pumping guidelines.  I reviewed physiology of colostrum and milk production, pumping guidelines, and I gave her a log and encouraged her to use it.  I explained how to access the videos \"Hand Expression\" and \"Maximizing Milk Production\"; as well as other helpful books and websites.   We discussed hands-on pumping techniques and usefulness of a hands-free pumping bra.  We discussed skin to skin holding and how to reach your breastfeeding goals.  We talked about medications during breastfeeding.  She verbalized understanding via teachback. She has a new Motif breastpump and a (used in previous pregnancy) Medela Pump in Style.   A:  Mom has information she needs to initiate her supply.   P: Will continue to provide lactation support.    JOVAN David, RN, IBCLC            "

## 2021-03-31 NOTE — PROGRESS NOTES
"[facility]   Postpartum Note    Name:  Shilpa Barboza  MRN: [id number]    S: Patient doing well this morning, has struggled to completely empty her bladder but overall doing well. Pain well controlled. Tolerating regular diet without nausea/vomiting. Ambulating without dizziness. Reports flatus. Lochia similar to menses. Pumping for baby in NICU. Undecided for postpartum contraception.     O:   Patient Vitals for the past 24 hrs:   BP Temp Temp src Pulse Resp SpO2 Height Weight   03/31/21 0609 101/63 98.2  F (36.8  C) Oral 73 16 99 % -- --   03/31/21 0158 102/58 97.9  F (36.6  C) Oral 88 16 98 % -- --   03/30/21 2205 102/68 98.2  F (36.8  C) Oral 81 16 100 % -- --   03/30/21 2101 106/72 98  F (36.7  C) Oral 84 16 -- -- --   03/30/21 1950 102/87 98.3  F (36.8  C) Oral 86 16 100 % -- --   03/30/21 1848 97/65 -- -- 86 18 100 % -- --   03/30/21 1752 103/71 97.9  F (36.6  C) Oral 79 18 100 % -- --   03/30/21 1729 107/68 -- -- 79 18 100 % -- --   03/30/21 1630 93/68 -- -- 75 18 100 % -- --   03/30/21 1615 104/70 -- -- 70 13 98 % -- --   03/30/21 1600 108/65 -- -- 82 17 99 % -- --   03/30/21 1545 107/72 -- -- 70 15 98 % -- --   03/30/21 1530 109/87 -- -- 83 13 99 % -- --   03/30/21 1515 101/74 -- -- 74 20 98 % -- --   03/30/21 1500 104/66 -- -- 76 16 98 % -- --   03/30/21 1440 102/68 98  F (36.7  C) Oral 80 18 -- -- --   03/30/21 1122 114/68 -- -- -- -- -- 1.626 m (5' 4\") 75.3 kg (166 lb)     Gen:  Resting comfortably, NAD  CV:  Regular rate, well perfused  Pulm:  Breathing room air comfortably  Abd:  Soft, appropriately ttp, non-distended. Fundus at umbilicus, firm and non-tender.  Incision: C/d/i, no surrounding redness or erythema with sterile dressing in place, no drainage or shadowing  Ext:  Non-tender, no LE edema    I/O last 3 completed shifts:  In: 1000 [I.V.:1000]  Out: 1026 [Urine:300; Blood:726]    Hgb:   Hemoglobin   Date Value Ref Range Status   03/29/2021 10.8 (L) 11.7 - " 15.7 g/dL Final     Assessment/Plan:  35 year old  on POD#1 s/p PLTCS in labor for controlled delivery in the setting of a complex fetal heart defect. Doing well postpartum.    Continue with routine postpartum care:  Pain: Well-controlled with Toradol and Tylenol, oxycodone prn. Transition to ibuprofen today  Hgb: 10.8> > 9.5. Patient  has no symptoms of acute blood loss anemia secondary to surgical blood loss and vitals are reassuring. Will discharge home with PO iron.  Rh: positive  Rubella: immune  Feed: pumping  BC: undecided    Hypothyroidism:  - Continue home Synthroid    Dispo: DC POD#2-3 pending postoperative goals    Marika Cade MD  Ob/Gyn Resident, PGY-3  21 6:59 AM     Staff MD Note    I appreciate the note by Dr. Cade.  Any necessary changes have been made by me.  I saw and evaluated the patient and agree with the findings and plan of care as documented in the note.  Having POUR.  Discussed if next post-void bladder scan > 200 ml plan for replacement armando catheter given has now had straight cath x 2 for bladder scan > 200 ml.  Patient otherwise appropriately recovering post-operatively.      Kirstin Grant MD

## 2021-03-31 NOTE — PROVIDER NOTIFICATION
03/30/21 2118   Provider Notification   Provider Name/Title Dr. Solis G2   Method of Notification Electronic Page   Request Evaluate-Remote   Notification Reason Other     FYI: Large amount of serosang. drainage to dressing. Increased from previous assessment  at 8pm. Almost entire dressing with drainage present. Thanks, Sherrill [id number]

## 2021-03-31 NOTE — PLAN OF CARE
Pt. VS and full assessment WDL. Up ad jaime in room and down to NICU. Voiding frequently and post void residual amount approx 120-220ml. MD ordered to not place armando catheter and to discontinue bladder scanning after voiding. Fundus firm and minimal bleeding reported from patient. Minimal pain and taking ibuprofen and tylenol. Incision with a small amount of blood, so steri strips were replaced per MD recommendation. Pumping every 2-3 hours.  at bedside and very supportive.

## 2021-04-01 PROCEDURE — 250N000013 HC RX MED GY IP 250 OP 250 PS 637: Performed by: STUDENT IN AN ORGANIZED HEALTH CARE EDUCATION/TRAINING PROGRAM

## 2021-04-01 PROCEDURE — 120N000002 HC R&B MED SURG/OB UMMC

## 2021-04-01 RX ORDER — IBUPROFEN 600 MG/1
600 TABLET, FILM COATED ORAL EVERY 6 HOURS PRN
Qty: 60 TABLET | Refills: 0 | Status: SHIPPED | OUTPATIENT
Start: 2021-04-01

## 2021-04-01 RX ORDER — FERROUS SULFATE 325(65) MG
325 TABLET ORAL
Qty: 60 TABLET | Refills: 0 | Status: SHIPPED | OUTPATIENT
Start: 2021-04-01

## 2021-04-01 RX ORDER — ACETAMINOPHEN 325 MG/1
650 TABLET ORAL EVERY 6 HOURS PRN
Qty: 100 TABLET | Refills: 0 | Status: SHIPPED | OUTPATIENT
Start: 2021-04-01

## 2021-04-01 RX ORDER — AMOXICILLIN 250 MG
1 CAPSULE ORAL DAILY
Qty: 100 TABLET | Refills: 0 | Status: SHIPPED | OUTPATIENT
Start: 2021-04-01

## 2021-04-01 RX ADMIN — IBUPROFEN 800 MG: 800 TABLET ORAL at 18:12

## 2021-04-01 RX ADMIN — ACETAMINOPHEN 975 MG: 325 TABLET, FILM COATED ORAL at 09:00

## 2021-04-01 RX ADMIN — DOCUSATE SODIUM AND SENNOSIDES 1 TABLET: 8.6; 5 TABLET ORAL at 08:40

## 2021-04-01 RX ADMIN — IBUPROFEN 800 MG: 800 TABLET ORAL at 05:59

## 2021-04-01 RX ADMIN — LEVOTHYROXINE SODIUM 50 MCG: 50 TABLET ORAL at 08:40

## 2021-04-01 RX ADMIN — IBUPROFEN 800 MG: 800 TABLET ORAL at 12:18

## 2021-04-01 RX ADMIN — DOCUSATE SODIUM AND SENNOSIDES 1 TABLET: 8.6; 5 TABLET ORAL at 21:18

## 2021-04-01 RX ADMIN — ACETAMINOPHEN 975 MG: 325 TABLET, FILM COATED ORAL at 15:39

## 2021-04-01 RX ADMIN — ACETAMINOPHEN 975 MG: 325 TABLET, FILM COATED ORAL at 21:18

## 2021-04-01 RX ADMIN — ACETAMINOPHEN 975 MG: 325 TABLET, FILM COATED ORAL at 02:56

## 2021-04-01 NOTE — PROGRESS NOTES
[facility]   Postpartum Note    Name:  Shilpa Barboza  MRN: [id number]    S: Patient doing well this morning, without complaints. Pain well controlled. Tolerating regular diet without nausea/vomiting. Ambulating without dizziness. Reports flatus but no bowel movement in the postpartum period. Lochia is lighter than menses. Pumping for baby in NICU.      O:   Patient Vitals for the past 24 hrs:   BP Temp Temp src Pulse Resp SpO2   21 0258 107/69 97.8  F (36.6  C) Oral 81 16 --   21 2026 105/69 98  F (36.7  C) Oral 89 16 --   21 1720 102/68 97.8  F (36.6  C) Oral -- 16 --   21 0700 106/71 97.6  F (36.4  C) Axillary 74 18 100 %     Gen:  Resting comfortably, NAD  CV:  Regular rate, well perfused  Pulm:  Breathing room air comfortably  Abd:  Soft, appropriately ttp, non-distended. Fundus 2 cm below umbilicus, firm and non-tender.  Incision: C/d/i, no surrounding redness or erythema with steri strips in place with minimal shadowing  Ext:  Non-tender, no LE edema    I/O last 3 completed shifts:  In: -   Out: 1375 [Urine:1375]    Hgb:   Hemoglobin   Date Value Ref Range Status   2021 9.5 (L) 11.7 - 15.7 g/dL Final     Assessment/Plan:  35 year old  on POD#2 s/p PLTCS in labor for controlled delivery in the setting of a complex fetal heart defect. Doing well postpartum.    Continue with routine postpartum care:  Pain: Well-controlled with ibuprofen and Tylenol, oxycodone prn. Patient reports that she is not taking her oxycodone as pain is well controlled on ibuprofen and tylenol.  Hgb: 10.8> > 9.5. Patient has no symptoms of acute blood loss anemia secondary to surgical blood loss and vitals are reassuring. Will discharge home with PO iron.  : Postoperative urinary retention, resolved  Rh: positive  Rubella: immune  Feed: pumping  BC: undecided    Hypothyroidism:  - Continue home Synthroid, 50 mcg qd    Dispo: DC POD#3 pending postoperative  goals. Baby is still in NICU and patient would like one more day to plan for discharge.    Chapo Levine MD  OBGYN PGY-1  April 1, 2021 6:50 AM    I have seen and examined the patient without  the resident. I have reviewed, edited, and agree with the note.   My findings are: appears well  Abdomen: soft, moderately distended and tympanitic  Incision CDI  LE without edema/erythema  Hemoglobin   Date Value Ref Range Status   03/31/2021 9.5 (L) 11.7 - 15.7 g/dL Final   03/29/2021 10.8 (L) 11.7 - 15.7 g/dL Final     If nausea or bloating worsen consider interventions for ileus  Acute blood lass anemia: stable, expected, and asymptomatic    Christelle Prasad MD

## 2021-04-01 NOTE — PLAN OF CARE
Pt reporting adequate pain conrtol with current pain medications. Walking down to NICU. Getting a couple of drops of colostrum at this time. Independent with using electric breast pump.

## 2021-04-01 NOTE — PLAN OF CARE
Pt is stable. Vital sign stable and postpartum checks within normal limits. Incision is in tact with sterile strips in place. Pt is up in the room, voiding and did have a bowel movement this morning. Pt is pumping independently,getting tinny colostrum at the tip of the nipple and collecting it with the cotton tip. Pt is ambulating down to NICU and is able to do skin to skin. Pain is well managed with Ibuprofen and Tylenol. Checked breast and did  hand expression. Continue cares and assist as needed.

## 2021-04-01 NOTE — PLAN OF CARE
Data: Vital signs within normal limits. Postpartum checks within normal limits - see flow record. Patient eating and drinking normally. Patient able to empty bladder independently and is up ambulating. No apparent signs of infection. Incision healing well, closed with steri strips. Patient performing self cares and is able to visit infant in NICU.   Action: Patient medicated during the shift for pain with tylenol and ibuprofen. See MAR. Patient reassessed within 1 hour after each medication and pain was improved - patient stated she was comfortable. Patient education done about pumping frequency and pain management. Encouraged to empty bladder frequently. See flow record.  Response: Positive attachment behaviors observed about infant. Support persons Breezy present.   Plan: Anticipate discharge on 4/1/21.

## 2021-04-02 VITALS
DIASTOLIC BLOOD PRESSURE: 66 MMHG | HEIGHT: 64 IN | WEIGHT: 166 LBS | HEART RATE: 76 BPM | OXYGEN SATURATION: 100 % | BODY MASS INDEX: 28.34 KG/M2 | TEMPERATURE: 98 F | SYSTOLIC BLOOD PRESSURE: 97 MMHG | RESPIRATION RATE: 16 BRPM

## 2021-04-02 PROCEDURE — 250N000013 HC RX MED GY IP 250 OP 250 PS 637: Performed by: STUDENT IN AN ORGANIZED HEALTH CARE EDUCATION/TRAINING PROGRAM

## 2021-04-02 RX ADMIN — IBUPROFEN 800 MG: 800 TABLET ORAL at 06:19

## 2021-04-02 RX ADMIN — DOCUSATE SODIUM AND SENNOSIDES 2 TABLET: 8.6; 5 TABLET ORAL at 07:23

## 2021-04-02 RX ADMIN — LEVOTHYROXINE SODIUM 50 MCG: 50 TABLET ORAL at 07:23

## 2021-04-02 RX ADMIN — ACETAMINOPHEN 975 MG: 325 TABLET, FILM COATED ORAL at 09:08

## 2021-04-02 RX ADMIN — IBUPROFEN 800 MG: 800 TABLET ORAL at 12:37

## 2021-04-02 RX ADMIN — ACETAMINOPHEN 975 MG: 325 TABLET, FILM COATED ORAL at 03:28

## 2021-04-02 RX ADMIN — IBUPROFEN 800 MG: 800 TABLET ORAL at 00:08

## 2021-04-02 NOTE — PROGRESS NOTES
[facility]   Postpartum Note    Name:  Shilpa Barboza  MRN: [id number]    S: Patient doing well this morning, no complaints. Pain well controlled with oral medications, a little more sore with transfers but tolerable. Tolerating regular diet without nausea/vomiting. Ambulating without dizziness. Reports flatus. Lochia is lighter than menses. Pumping for baby in NICU. No other questions or concerns.    O:   Patient Vitals for the past 24 hrs:   BP Temp Temp src Pulse Resp   21 0000 112/75 98.1  F (36.7  C) Oral 89 16   21 1544 113/75 98.1  F (36.7  C) Oral 85 18   21 0838 107/73 98.1  F (36.7  C) Oral 86 16     Gen:  Resting comfortably, NAD  CV:  Regular rate, well perfused  Pulm:  Breathing room air comfortably  Abd:  Soft, appropriately ttp, non-distended. Fundus 2 cm below umbilicus, firm and non-tender.  Incision: C/d/i, no surrounding redness or erythema, steri strips in place  Ext:  Non-tender, no LE edema    Assessment/Plan:  35 year old  on POD#3 s/p PLTCS in labor for controlled delivery in the setting of a complex fetal heart defect. Doing well postpartum.    Continue with routine postpartum care:  Pain: Well-controlled with ibuprofen and Tylenol, oxycodone prn.  Hgb: 10.8> > 9.5. Patient has no symptoms of acute blood loss anemia secondary to surgical blood loss and vitals are reassuring. Will discharge home with PO iron.  : Postoperative urinary retention, resolved  Rh: positive  Rubella: immune  Feed: pumping  BC: undecided    Hypothyroidism:  - Continue home Synthroid, 50 mcg qd    Dispo: DC today with f/u 2 wks and 6 wks.    Marika Cade MD  Ob/Gyn Resident, PGY-3  21 6:34 AM     [facility] staff:  Appreciate note by Dr. Cade.  I have seen and examined the patient without the resident. I have reviewed, edited, and agree with the note.      Mine Sousa MD, FACOG  2021  8:33 AM

## 2021-04-02 NOTE — PLAN OF CARE
Patient VSS, FFU/2, with scant lochia. Voiding without difficulty.  Patient is independent with self cares and is ambulating without assistance.  Incision is CDI.  Patient is taking Ibuprofen and Tylenol for pain and states the medication is controlling her pain adequately.  Plan of care reviewed with patient and spouse, understanding verbalized.  Continue with postpartum cares and assemssments.

## 2021-04-02 NOTE — PLAN OF CARE
Pt discharged to home. Baby continued to be in NICU.   Instructions & Prescriptions given & reviewed. Pt & her    verbalized understanding the plan & had no further questions.   Instructed to follow up at clinic within 6 weeks for PP check.

## 2021-04-15 ENCOUNTER — OFFICE VISIT (OUTPATIENT)
Dept: MATERNAL FETAL MEDICINE | Facility: CLINIC | Age: 36
End: 2021-04-15
Attending: ADVANCED PRACTICE MIDWIFE
Payer: COMMERCIAL

## 2021-04-15 VITALS
RESPIRATION RATE: 18 BRPM | BODY MASS INDEX: 25.73 KG/M2 | SYSTOLIC BLOOD PRESSURE: 107 MMHG | HEART RATE: 70 BPM | OXYGEN SATURATION: 97 % | WEIGHT: 149.9 LBS | DIASTOLIC BLOOD PRESSURE: 55 MMHG

## 2021-04-15 PROCEDURE — 99212 OFFICE O/P EST SF 10 MIN: CPT | Mod: 24 | Performed by: ADVANCED PRACTICE MIDWIFE

## 2021-04-15 PROCEDURE — G0463 HOSPITAL OUTPT CLINIC VISIT: HCPCS

## 2021-04-15 NOTE — PROGRESS NOTES
[facility] Midwife Postpartum 2 Week Visit    Shilpa Barboza is a 35 year old here for a 2 week postpartum checkup.     Delivery date was 3/30/21. She had a c/s at 38w6d of a viable girl with a known CHD. Baby is still in the NICU, but Shilpa reports she is doing well overall.    Since delivery, she has been pumping.  She has not had any signs of infection. Her lochia is now minimal.  She has not had other complications.      She is voiding and having bowel movements without difficulty.       She  has not had intercourse since delivery.   She complains of mild discomfort with sustained activity, but is using tylenol and ibuprofen as needed.    Mood is Stable  Patient screened for postpartum depression.   Depression Rating was: EPDS =2  Last PHQ-9 score on record =   PHQ-9 SCORE 3/22/2021   PHQ-9 Total Score 3       ROS:  CONSTITUTIONAL: NEGATIVE for fever, chills, change in weight  INTEGUMENTARU/SKIN: NEGATIVE for worrisome rashes, moles or lesions  EYES: NEGATIVE for vision changes or irritation  ENT: NEGATIVE for ear, mouth and throat problems  RESP: NEGATIVE for significant cough or SOB  BREAST: NEGATIVE for masses, tenderness or discharge  CV: NEGATIVE for chest pain, palpitations or peripheral edema  GI: NEGATIVE for nausea, abdominal pain, heartburn, or change in bowel habits  : NEGATIVE for unusual urinary or vaginal symptoms. Periods are regular.  MUSCULOSKELETAL: NEGATIVE for significant arthralgias or myalgia  NEURO: NEGATIVE for weakness, dizziness or paresthesias  PSYCHIATRIC: NEGATIVE for changes in mood or affect      Current Outpatient Medications:      acetaminophen (TYLENOL) 325 MG tablet, Take 2 tablets (650 mg) by mouth every 6 hours as needed for mild pain Start after Delivery., Disp: 100 tablet, Rfl: 0     ascorbic acid (VITAMIN C) 250 MG CHEW chewable tablet, Take 250 mg by mouth daily, Disp: , Rfl:      CALCIUM-VITAMIN D PO, , Disp: , Rfl:      Cyanocobalamin (VITAMIN B-12 CR PO), , Disp: ,  Rfl:      ferrous sulfate (FEROSUL) 325 (65 Fe) MG tablet, Take 1 tablet (325 mg) by mouth daily (with breakfast), Disp: 60 tablet, Rfl: 0     ibuprofen (ADVIL/MOTRIN) 600 MG tablet, Take 1 tablet (600 mg) by mouth every 6 hours as needed for moderate pain Start after delivery, Disp: 60 tablet, Rfl: 0     levothyroxine (SYNTHROID/LEVOTHROID) 50 MCG tablet, Take 50 mcg by mouth daily, Disp: , Rfl:      MAGNESIUM PO, Take 1 tablet by mouth daily, Disp: , Rfl:      Prenatal Vit-Fe Fumarate-FA (PRENATAL MULTIVITAMIN PLUS IRON) 27-0.8 MG TABS per tablet, Take 1 tablet by mouth daily, Disp: , Rfl:      riboflavin (VITAMIN  B-2) 100 MG TABS tablet, Take 4 tablets by mouth daily, Disp: , Rfl:      senna-docusate (SENOKOT-S/PERICOLACE) 8.6-50 MG tablet, Take 1 tablet by mouth daily Start after delivery., Disp: 100 tablet, Rfl: 0     Ferrous Sulfate (IRON) 325 (65 Fe) MG tablet, Take 1 tablet by mouth daily, Disp: , Rfl: .   OB History    Para Term  AB Living   3 2 2 0 1 2   SAB TAB Ectopic Multiple Live Births   1 0 0 0 2      # Outcome Date GA Lbr Eliceo/2nd Weight Sex Delivery Anes PTL Lv   3 Term 21 38w6d   F CS-LTranv Spinal  MARVA      Name: SANDEEP,FEMALE-REY      Apgar1: 8  Apgar5: 8   2 Term 20 39w3d 07:52 / 01:53 3.49 kg (7 lb 11.1 oz) F Vag-Spont EPI  MARVA      Name: SANDEEP,BABY1 REY      Apgar1: 8  Apgar5: 8   1 SAB  8w0d              EXAM:  /55 (BP Location: Left arm, Patient Position: Sitting, Cuff Size: Adult Regular)   Pulse 70   Resp 18   Wt 68 kg (149 lb 14.4 oz)   LMP 2020   SpO2 97%   BMI 25.73 kg/m    BMI: Body mass index is 25.73 kg/m .  Exam:  Constitutional: healthy, alert and no distress  Respiratory: negative, Percussion normal. Good diaphragmatic excursion.   Psychiatric: mentation appears normal and affect normal/bright    ASSESSMENT:   Normal postpartum exam after c/s for known CHD.    ICD-10-CM    1. Routine postpartum follow-up  Z39.2  ABI Office Visit         PLAN:  Teaching: birth control and mental health  Family Planning: both of her pregnancies were conceived via IUI. She has used OCPs in the past, but may consider an IUD. She will make this plan with her primary OB/GYN at her routine 6 week pp exam.  Continue a multivitamin/prenatal supplement, especially if breastfeeding.    15 minutes spent on the date of the encounter, doing chart review, history and exam, documentation and further activities as noted.      Alondra Simon CNM on 4/15/2021 at 2:11 PM

## 2021-04-15 NOTE — NURSING NOTE
Shilpa seen in clinic today for OB postpartum visit s/p C/S 2 weeks ago. VSS. Pt denies headache/vision changes/chest pain/SOB/edema. Pt states bleeding continues to decrease. She described it as spotting today. Pt encouraged to ask to speak with Lactation regarding pumping concerns/questions. Writer spoke to pt about mood, how she was feeling and handling the stress of her daughter being inpatient on [facility] ICU. Pt stated she was coping well at this time and has her moments but overall feels like she is doing ok. EDS score was a 2 today. Pt encouraged to call and ask to be seen if she starts to feel like things are getting on top of her or is noticing depression signs. SAVANNAH Simon CNM met with pt and discussed POC. Plan for 6 week postpartum to be done with [facility].  Pt discharged stable and ambulatory.

## 2021-10-24 ENCOUNTER — HEALTH MAINTENANCE LETTER (OUTPATIENT)
Age: 36
End: 2021-10-24

## 2022-07-31 ENCOUNTER — HEALTH MAINTENANCE LETTER (OUTPATIENT)
Age: 37
End: 2022-07-31

## 2022-10-15 ENCOUNTER — HEALTH MAINTENANCE LETTER (OUTPATIENT)
Age: 37
End: 2022-10-15

## 2023-08-20 ENCOUNTER — HEALTH MAINTENANCE LETTER (OUTPATIENT)
Age: 38
End: 2023-08-20

## (undated) DEVICE — SOL WATER IRRIG 1000ML BOTTLE 07139-09

## (undated) DEVICE — CATH TRAY FOLEY 16FR BARDEX W/DRAIN BAG STATLOCK 300316A

## (undated) DEVICE — DRSG ABDOMINAL 07 1/2X8" 7197D

## (undated) DEVICE — STOCKING SLEEVE COMPRESSION CALF LG

## (undated) DEVICE — SU MONOCRYL 0 CTB-1 36" YB946

## (undated) DEVICE — ESU GROUND PAD UNIVERSAL W/O CORD

## (undated) DEVICE — SU VICRYL 4-0 PS-2 18" UND J496G

## (undated) DEVICE — SOL ADH LIQUID BENZOIN SWAB 0.6ML C1544

## (undated) DEVICE — PACK C-SECTION LF PL15OTA83B

## (undated) DEVICE — BNDG ABDOMINAL BINDER 10X26-50" 08140145

## (undated) DEVICE — SOL NACL 0.9% IRRIG 1000ML BOTTLE 07138-09

## (undated) DEVICE — STRAP KNEE/BODY 31143004

## (undated) DEVICE — DRSG STERI STRIP 1/2X4" R1547

## (undated) DEVICE — PREP CHLORAPREP 26ML TINTED ORANGE  260815

## (undated) DEVICE — GLOVE ESTEEM POWDER FREE SMT 6.5  2D72PT65

## (undated) DEVICE — SU VICRYL 0 CT-1 36" J346H

## (undated) RX ORDER — ONDANSETRON 2 MG/ML
INJECTION INTRAMUSCULAR; INTRAVENOUS
Status: DISPENSED
Start: 2021-03-30

## (undated) RX ORDER — EPINEPHRINE 1 MG/ML
INJECTION, SOLUTION, CONCENTRATE INTRAVENOUS
Status: DISPENSED
Start: 2021-03-30

## (undated) RX ORDER — OXYTOCIN/0.9 % SODIUM CHLORIDE 30/500 ML
PLASTIC BAG, INJECTION (ML) INTRAVENOUS
Status: DISPENSED
Start: 2021-03-30

## (undated) RX ORDER — LIDOCAINE HYDROCHLORIDE 20 MG/ML
INJECTION, SOLUTION EPIDURAL; INFILTRATION; INTRACAUDAL; PERINEURAL
Status: DISPENSED
Start: 2021-03-30

## (undated) RX ORDER — FENTANYL CITRATE 50 UG/ML
INJECTION, SOLUTION INTRAMUSCULAR; INTRAVENOUS
Status: DISPENSED
Start: 2021-03-30

## (undated) RX ORDER — KETOROLAC TROMETHAMINE 30 MG/ML
INJECTION, SOLUTION INTRAMUSCULAR; INTRAVENOUS
Status: DISPENSED
Start: 2021-03-30

## (undated) RX ORDER — DEXAMETHASONE SODIUM PHOSPHATE 4 MG/ML
INJECTION, SOLUTION INTRA-ARTICULAR; INTRALESIONAL; INTRAMUSCULAR; INTRAVENOUS; SOFT TISSUE
Status: DISPENSED
Start: 2021-03-30

## (undated) RX ORDER — FENTANYL CITRATE-0.9 % NACL/PF 10 MCG/ML
PLASTIC BAG, INJECTION (ML) INTRAVENOUS
Status: DISPENSED
Start: 2021-03-30

## (undated) RX ORDER — CEFAZOLIN SODIUM 1 G/3ML
INJECTION, POWDER, FOR SOLUTION INTRAMUSCULAR; INTRAVENOUS
Status: DISPENSED
Start: 2021-03-30

## (undated) RX ORDER — MORPHINE SULFATE 1 MG/ML
INJECTION, SOLUTION EPIDURAL; INTRATHECAL; INTRAVENOUS
Status: DISPENSED
Start: 2021-03-30